# Patient Record
Sex: FEMALE | Race: WHITE | Employment: OTHER | URBAN - METROPOLITAN AREA
[De-identification: names, ages, dates, MRNs, and addresses within clinical notes are randomized per-mention and may not be internally consistent; named-entity substitution may affect disease eponyms.]

---

## 2018-06-15 ENCOUNTER — APPOINTMENT (OUTPATIENT)
Dept: CT IMAGING | Age: 58
End: 2018-06-15
Payer: COMMERCIAL

## 2018-06-15 ENCOUNTER — HOSPITAL ENCOUNTER (EMERGENCY)
Age: 58
Discharge: HOME | End: 2018-06-15
Attending: EMERGENCY MEDICINE
Payer: COMMERCIAL

## 2018-06-15 VITALS
BODY MASS INDEX: 25.03 KG/M2 | HEART RATE: 74 BPM | TEMPERATURE: 98.1 F | RESPIRATION RATE: 16 BRPM | SYSTOLIC BLOOD PRESSURE: 132 MMHG | WEIGHT: 136 LBS | HEIGHT: 62 IN | DIASTOLIC BLOOD PRESSURE: 78 MMHG | OXYGEN SATURATION: 99 %

## 2018-06-15 DIAGNOSIS — R10.11 RIGHT UPPER QUADRANT ABDOMINAL PAIN: Primary | ICD-10-CM

## 2018-06-15 LAB
ALBUMIN SERPL-MCNC: 3.8 G/DL (ref 3.5–5.2)
ALP BLD-CCNC: 92 U/L (ref 35–104)
ALT SERPL-CCNC: 46 U/L (ref 0–32)
ANION GAP SERPL CALCULATED.3IONS-SCNC: 16 MMOL/L (ref 7–16)
AST SERPL-CCNC: 29 U/L (ref 0–31)
BASOPHILS ABSOLUTE: 0.02 E9/L (ref 0–0.2)
BASOPHILS RELATIVE PERCENT: 0.3 % (ref 0–2)
BILIRUB SERPL-MCNC: 0.3 MG/DL (ref 0–1.2)
BILIRUBIN URINE: NEGATIVE
BLOOD, URINE: NEGATIVE
BUN BLDV-MCNC: 28 MG/DL (ref 6–20)
CALCIUM SERPL-MCNC: 9.5 MG/DL (ref 8.6–10.2)
CHLORIDE BLD-SCNC: 102 MMOL/L (ref 98–107)
CLARITY: CLEAR
CO2: 22 MMOL/L (ref 22–29)
COLOR: YELLOW
CREAT SERPL-MCNC: 0.7 MG/DL (ref 0.5–1)
EKG ATRIAL RATE: 79 BPM
EKG P AXIS: 60 DEGREES
EKG P-R INTERVAL: 144 MS
EKG Q-T INTERVAL: 392 MS
EKG QRS DURATION: 72 MS
EKG QTC CALCULATION (BAZETT): 449 MS
EKG R AXIS: 66 DEGREES
EKG T AXIS: 75 DEGREES
EKG VENTRICULAR RATE: 79 BPM
EOSINOPHILS ABSOLUTE: 0.09 E9/L (ref 0.05–0.5)
EOSINOPHILS RELATIVE PERCENT: 1.3 % (ref 0–6)
GFR AFRICAN AMERICAN: >60
GFR NON-AFRICAN AMERICAN: >60 ML/MIN/1.73
GLUCOSE BLD-MCNC: 227 MG/DL (ref 74–109)
GLUCOSE URINE: NEGATIVE MG/DL
HCT VFR BLD CALC: 38.2 % (ref 34–48)
HEMOGLOBIN: 13 G/DL (ref 11.5–15.5)
IMMATURE GRANULOCYTES #: 0.01 E9/L
IMMATURE GRANULOCYTES %: 0.1 % (ref 0–5)
KETONES, URINE: NEGATIVE MG/DL
LACTIC ACID: 1.4 MMOL/L (ref 0.5–2.2)
LEUKOCYTE ESTERASE, URINE: NEGATIVE
LIPASE: 73 U/L (ref 13–60)
LYMPHOCYTES ABSOLUTE: 2.88 E9/L (ref 1.5–4)
LYMPHOCYTES RELATIVE PERCENT: 41.7 % (ref 20–42)
MCH RBC QN AUTO: 29.6 PG (ref 26–35)
MCHC RBC AUTO-ENTMCNC: 34 % (ref 32–34.5)
MCV RBC AUTO: 87 FL (ref 80–99.9)
MONOCYTES ABSOLUTE: 0.65 E9/L (ref 0.1–0.95)
MONOCYTES RELATIVE PERCENT: 9.4 % (ref 2–12)
NEUTROPHILS ABSOLUTE: 3.25 E9/L (ref 1.8–7.3)
NEUTROPHILS RELATIVE PERCENT: 47.2 % (ref 43–80)
NITRITE, URINE: NEGATIVE
PDW BLD-RTO: 12.1 FL (ref 11.5–15)
PH UA: 7 (ref 5–9)
PLATELET # BLD: 259 E9/L (ref 130–450)
PMV BLD AUTO: 11.1 FL (ref 7–12)
POTASSIUM SERPL-SCNC: 4.3 MMOL/L (ref 3.5–5)
PROTEIN UA: NEGATIVE MG/DL
RBC # BLD: 4.39 E12/L (ref 3.5–5.5)
SODIUM BLD-SCNC: 140 MMOL/L (ref 132–146)
SPECIFIC GRAVITY UA: 1.01 (ref 1–1.03)
TOTAL PROTEIN: 6.9 G/DL (ref 6.4–8.3)
TROPONIN: <0.01 NG/ML (ref 0–0.03)
UROBILINOGEN, URINE: 0.2 E.U./DL
WBC # BLD: 6.9 E9/L (ref 4.5–11.5)

## 2018-06-15 PROCEDURE — 85025 COMPLETE CBC W/AUTO DIFF WBC: CPT

## 2018-06-15 PROCEDURE — 6360000002 HC RX W HCPCS: Performed by: EMERGENCY MEDICINE

## 2018-06-15 PROCEDURE — 2580000003 HC RX 258: Performed by: EMERGENCY MEDICINE

## 2018-06-15 PROCEDURE — 6360000004 HC RX CONTRAST MEDICATION: Performed by: RADIOLOGY

## 2018-06-15 PROCEDURE — 83605 ASSAY OF LACTIC ACID: CPT

## 2018-06-15 PROCEDURE — 80053 COMPREHEN METABOLIC PANEL: CPT

## 2018-06-15 PROCEDURE — 93005 ELECTROCARDIOGRAM TRACING: CPT | Performed by: EMERGENCY MEDICINE

## 2018-06-15 PROCEDURE — 74177 CT ABD & PELVIS W/CONTRAST: CPT

## 2018-06-15 PROCEDURE — 99284 EMERGENCY DEPT VISIT MOD MDM: CPT

## 2018-06-15 PROCEDURE — 96374 THER/PROPH/DIAG INJ IV PUSH: CPT

## 2018-06-15 PROCEDURE — 36415 COLL VENOUS BLD VENIPUNCTURE: CPT

## 2018-06-15 PROCEDURE — 83690 ASSAY OF LIPASE: CPT

## 2018-06-15 PROCEDURE — 81003 URINALYSIS AUTO W/O SCOPE: CPT

## 2018-06-15 PROCEDURE — 84484 ASSAY OF TROPONIN QUANT: CPT

## 2018-06-15 RX ORDER — SODIUM CHLORIDE 9 MG/ML
1000 INJECTION, SOLUTION INTRAVENOUS ONCE
Status: COMPLETED | OUTPATIENT
Start: 2018-06-15 | End: 2018-06-15

## 2018-06-15 RX ORDER — KETOROLAC TROMETHAMINE 30 MG/ML
30 INJECTION, SOLUTION INTRAMUSCULAR; INTRAVENOUS ONCE
Status: COMPLETED | OUTPATIENT
Start: 2018-06-15 | End: 2018-06-15

## 2018-06-15 RX ADMIN — IOPAMIDOL 80 ML: 755 INJECTION, SOLUTION INTRAVENOUS at 05:16

## 2018-06-15 RX ADMIN — SODIUM CHLORIDE 1000 ML: 9 INJECTION, SOLUTION INTRAVENOUS at 04:21

## 2018-06-15 RX ADMIN — KETOROLAC TROMETHAMINE 30 MG: 30 INJECTION, SOLUTION INTRAMUSCULAR at 04:21

## 2018-06-15 ASSESSMENT — PAIN DESCRIPTION - DESCRIPTORS
DESCRIPTORS: ACHING
DESCRIPTORS: ACHING

## 2018-06-15 ASSESSMENT — ENCOUNTER SYMPTOMS
NAUSEA: 0
COUGH: 0
SORE THROAT: 0
ABDOMINAL PAIN: 1
SINUS PRESSURE: 0
BACK PAIN: 0
EYE DISCHARGE: 0
EYE PAIN: 0
EYE REDNESS: 0
DIARRHEA: 0
SHORTNESS OF BREATH: 0
ABDOMINAL DISTENTION: 0
VOMITING: 0
WHEEZING: 0

## 2018-06-15 ASSESSMENT — PAIN DESCRIPTION - PROGRESSION: CLINICAL_PROGRESSION: RAPIDLY IMPROVING

## 2018-06-15 ASSESSMENT — PAIN SCALES - GENERAL
PAINLEVEL_OUTOF10: 8
PAINLEVEL_OUTOF10: 10
PAINLEVEL_OUTOF10: 5

## 2018-06-15 ASSESSMENT — PAIN DESCRIPTION - PAIN TYPE
TYPE: ACUTE PAIN
TYPE: ACUTE PAIN

## 2018-06-15 ASSESSMENT — PAIN DESCRIPTION - ORIENTATION
ORIENTATION: RIGHT
ORIENTATION: RIGHT;MID

## 2018-09-14 ENCOUNTER — APPOINTMENT (OUTPATIENT)
Dept: GENERAL RADIOLOGY | Age: 58
End: 2018-09-14
Payer: COMMERCIAL

## 2018-09-14 ENCOUNTER — APPOINTMENT (OUTPATIENT)
Dept: ULTRASOUND IMAGING | Age: 58
End: 2018-09-14
Payer: COMMERCIAL

## 2018-09-14 ENCOUNTER — HOSPITAL ENCOUNTER (EMERGENCY)
Age: 58
Discharge: HOME | End: 2018-09-15
Attending: EMERGENCY MEDICINE
Payer: COMMERCIAL

## 2018-09-14 VITALS
BODY MASS INDEX: 22.82 KG/M2 | HEIGHT: 62 IN | SYSTOLIC BLOOD PRESSURE: 164 MMHG | OXYGEN SATURATION: 98 % | HEART RATE: 73 BPM | DIASTOLIC BLOOD PRESSURE: 72 MMHG | TEMPERATURE: 98.9 F | RESPIRATION RATE: 18 BRPM | WEIGHT: 124 LBS

## 2018-09-14 DIAGNOSIS — R07.81 RIB PAIN ON RIGHT SIDE: Primary | ICD-10-CM

## 2018-09-14 LAB
ALBUMIN SERPL-MCNC: 4.1 G/DL (ref 3.5–5.2)
ALP BLD-CCNC: 111 U/L (ref 35–104)
ALT SERPL-CCNC: 18 U/L (ref 0–32)
ANION GAP SERPL CALCULATED.3IONS-SCNC: 13 MMOL/L (ref 7–16)
AST SERPL-CCNC: 28 U/L (ref 0–31)
BILIRUB SERPL-MCNC: 0.5 MG/DL (ref 0–1.2)
BUN BLDV-MCNC: 23 MG/DL (ref 6–20)
CALCIUM SERPL-MCNC: 10 MG/DL (ref 8.6–10.2)
CHLORIDE BLD-SCNC: 104 MMOL/L (ref 98–107)
CO2: 21 MMOL/L (ref 22–29)
CREAT SERPL-MCNC: 0.5 MG/DL (ref 0.5–1)
GFR AFRICAN AMERICAN: >60
GFR NON-AFRICAN AMERICAN: >60 ML/MIN/1.73
GLUCOSE BLD-MCNC: 138 MG/DL (ref 74–109)
HCT VFR BLD CALC: 41.9 % (ref 34–48)
HEMOGLOBIN: 14 G/DL (ref 11.5–15.5)
LIPASE: 58 U/L (ref 13–60)
MCH RBC QN AUTO: 29.7 PG (ref 26–35)
MCHC RBC AUTO-ENTMCNC: 33.4 % (ref 32–34.5)
MCV RBC AUTO: 89 FL (ref 80–99.9)
PDW BLD-RTO: 12.6 FL (ref 11.5–15)
PLATELET # BLD: 263 E9/L (ref 130–450)
PMV BLD AUTO: 11.1 FL (ref 7–12)
POTASSIUM SERPL-SCNC: 5 MMOL/L (ref 3.5–5)
RBC # BLD: 4.71 E12/L (ref 3.5–5.5)
SODIUM BLD-SCNC: 138 MMOL/L (ref 132–146)
TOTAL PROTEIN: 7.5 G/DL (ref 6.4–8.3)
WBC # BLD: 8.6 E9/L (ref 4.5–11.5)

## 2018-09-14 PROCEDURE — 85027 COMPLETE CBC AUTOMATED: CPT

## 2018-09-14 PROCEDURE — 83690 ASSAY OF LIPASE: CPT

## 2018-09-14 PROCEDURE — 76705 ECHO EXAM OF ABDOMEN: CPT

## 2018-09-14 PROCEDURE — 99285 EMERGENCY DEPT VISIT HI MDM: CPT

## 2018-09-14 PROCEDURE — 80053 COMPREHEN METABOLIC PANEL: CPT

## 2018-09-14 PROCEDURE — 6360000002 HC RX W HCPCS: Performed by: STUDENT IN AN ORGANIZED HEALTH CARE EDUCATION/TRAINING PROGRAM

## 2018-09-14 PROCEDURE — 96374 THER/PROPH/DIAG INJ IV PUSH: CPT

## 2018-09-14 PROCEDURE — 36415 COLL VENOUS BLD VENIPUNCTURE: CPT

## 2018-09-14 PROCEDURE — 71046 X-RAY EXAM CHEST 2 VIEWS: CPT

## 2018-09-14 RX ORDER — IBUPROFEN 800 MG/1
800 TABLET ORAL EVERY 6 HOURS PRN
Qty: 20 TABLET | Refills: 3 | Status: SHIPPED | OUTPATIENT
Start: 2018-09-14 | End: 2018-09-14

## 2018-09-14 RX ORDER — KETOROLAC TROMETHAMINE 15 MG/ML
15 INJECTION, SOLUTION INTRAMUSCULAR; INTRAVENOUS ONCE
Status: COMPLETED | OUTPATIENT
Start: 2018-09-14 | End: 2018-09-14

## 2018-09-14 RX ORDER — CYCLOBENZAPRINE HCL 10 MG
10 TABLET ORAL 3 TIMES DAILY PRN
Status: DISCONTINUED | OUTPATIENT
Start: 2018-09-14 | End: 2018-09-14

## 2018-09-14 RX ORDER — IBUPROFEN 800 MG/1
800 TABLET ORAL EVERY 6 HOURS PRN
Qty: 20 TABLET | Refills: 0 | Status: SHIPPED | OUTPATIENT
Start: 2018-09-14 | End: 2022-08-29 | Stop reason: ALTCHOICE

## 2018-09-14 RX ORDER — CYCLOBENZAPRINE HCL 10 MG
10 TABLET ORAL ONCE
Status: COMPLETED | OUTPATIENT
Start: 2018-09-15 | End: 2018-09-15

## 2018-09-14 RX ADMIN — KETOROLAC TROMETHAMINE 15 MG: 15 INJECTION, SOLUTION INTRAMUSCULAR; INTRAVENOUS at 22:46

## 2018-09-14 ASSESSMENT — PAIN SCALES - GENERAL
PAINLEVEL_OUTOF10: 10
PAINLEVEL_OUTOF10: 10

## 2018-09-14 ASSESSMENT — ENCOUNTER SYMPTOMS
SHORTNESS OF BREATH: 0
COUGH: 0
RHINORRHEA: 0
VOMITING: 0
SORE THROAT: 0
DIARRHEA: 0
BACK PAIN: 0
NAUSEA: 0
ABDOMINAL PAIN: 0

## 2018-09-14 ASSESSMENT — PAIN DESCRIPTION - PROGRESSION: CLINICAL_PROGRESSION: GRADUALLY WORSENING

## 2018-09-14 ASSESSMENT — PAIN DESCRIPTION - DESCRIPTORS: DESCRIPTORS: SHARP

## 2018-09-14 ASSESSMENT — PAIN DESCRIPTION - LOCATION: LOCATION: RIB CAGE

## 2018-09-14 ASSESSMENT — PAIN DESCRIPTION - ORIENTATION: ORIENTATION: RIGHT

## 2018-09-14 ASSESSMENT — PAIN DESCRIPTION - FREQUENCY: FREQUENCY: CONTINUOUS

## 2018-09-14 ASSESSMENT — PAIN DESCRIPTION - PAIN TYPE: TYPE: ACUTE PAIN

## 2018-09-15 PROCEDURE — 6370000000 HC RX 637 (ALT 250 FOR IP): Performed by: STUDENT IN AN ORGANIZED HEALTH CARE EDUCATION/TRAINING PROGRAM

## 2018-09-15 RX ADMIN — CYCLOBENZAPRINE 10 MG: 10 TABLET, FILM COATED ORAL at 00:09

## 2018-09-15 NOTE — ED PROVIDER NOTES
Bowel sounds are normal. There is no tenderness. There is no rebound and no guarding. Musculoskeletal: Normal range of motion. She exhibits tenderness. She exhibits no deformity. She has extreme tenderness to palpation of right rib cage. Neurological: She is alert and oriented to person, place, and time. No cranial nerve deficit. Coordination normal.   Skin: Skin is warm. No rash noted. Nursing note and vitals reviewed. Procedures    MDM    Patient presents to the ED for right sided rib pain. Differential diagnoses included but not limited to rib fracture, pancreatitis, . Workup in the ED revealed negative CXR, negative RUQ us, negative lipase. Patient was given Toradol and Flexeril for their symptoms with good improvement. Pain decreased however worsened with ultrasound exam. Patient continues to be non-toxic on re-evaluation. Findings were discussed with the patient and reasons to immediately return to the ED were articulated to them. They will follow-up with their PCP, which she doesn't have so a referral to [facility] was given. ED Course as of Sep 15 0145   Fri Sep 14, 2018   2303 ATTENDING PROVIDER ATTESTATION:     I have personally performed and/or participated in the history, exam, medical decision making, and procedures and agree with all pertinent clinical information unless otherwise noted. I have also reviewed and agree with the past medical, family and social history unless otherwise noted. I have discussed this patient in detail with the resident, and provided the instruction and education regarding patient complaining of right lower lateral rib pain. She was bending over out of a chair to pick something up couple days ago she had a sudden onset of sharp pain to the right lateral ribs in the same area that she had a recent broken rib. Denies shortness of breath although having pain with deep breathing and pain with movement of the chest wall.  If she holds still she is able to control the pain. No fevers or productive cough. No abdominal pain. No nausea or vomiting. No leg pain or swelling. No new fall or other injury. .  My findings/plan: Patient sitting the bed appears uncomfortable but in no distress. Has some splinting to breathing and pain on palpation of the right lower lateral rib, chest wall. Abdomen is soft with no particular tenderness although as you get near the ribs she starts to guard a little bit but states that it is mostly the ribs are hurting on the abdomen. She has no midline cervical, thoracic or lumbar spine tenderness and her breath sounds are equal. No jaundice or icterus. No pretibial edema or calf pain. [NC]   5364 To reevaluate the patient. She states that she had an extremely painful time during ultrasound. Currently she says without moving she is a 5 out of 10 pain. [BB]      ED Course User Index  [BB] Luis Miguel Chetan,   [NC] Rhea Ku DO       --------------------------------------------- PAST HISTORY ---------------------------------------------  Past Medical History:  has a past medical history of Bulging of intervertebral disc between L4 and L5; Diabetes mellitus type 2, uncomplicated ([facility] [address] Essential hypertension; Mixed hyperlipidemia; and Seizures ([facility] [address] Past Surgical History:  has a past surgical history that includes Tonsillectomy; Appendectomy; and Hysterectomy. Social History:  reports that she has been smoking Cigarettes. She has been smoking about 0.50 packs per day. She has never used smokeless tobacco. She reports that she does not drink alcohol or use drugs. Family History: family history is not on file. The patients home medications have been reviewed.     Allergies: Morphine and related    -------------------------------------------------- RESULTS -------------------------------------------------  Labs:  Results for orders placed or performed during the hospital encounter of 09/14/18   CBC   Result Value

## 2018-12-25 ENCOUNTER — HOSPITAL ENCOUNTER (EMERGENCY)
Age: 58
Discharge: LEFT BEFORE DISCHARGE | End: 2018-12-25
Payer: COMMERCIAL

## 2019-05-07 ENCOUNTER — HOSPITAL ENCOUNTER (EMERGENCY)
Age: 59
Discharge: HOME | End: 2019-05-07
Attending: EMERGENCY MEDICINE
Payer: COMMERCIAL

## 2019-05-07 VITALS
HEART RATE: 67 BPM | OXYGEN SATURATION: 99 % | WEIGHT: 125 LBS | HEIGHT: 62 IN | RESPIRATION RATE: 24 BRPM | DIASTOLIC BLOOD PRESSURE: 88 MMHG | SYSTOLIC BLOOD PRESSURE: 172 MMHG | BODY MASS INDEX: 23 KG/M2 | TEMPERATURE: 98.7 F

## 2019-05-07 DIAGNOSIS — M54.50 ACUTE EXACERBATION OF CHRONIC LOW BACK PAIN: Primary | ICD-10-CM

## 2019-05-07 DIAGNOSIS — G40.909 SEIZURE DISORDER (HCC): ICD-10-CM

## 2019-05-07 DIAGNOSIS — G89.29 ACUTE EXACERBATION OF CHRONIC LOW BACK PAIN: Primary | ICD-10-CM

## 2019-05-07 LAB
ANION GAP SERPL CALCULATED.3IONS-SCNC: 11 MMOL/L (ref 7–16)
BASOPHILS ABSOLUTE: 0.02 E9/L (ref 0–0.2)
BASOPHILS RELATIVE PERCENT: 0.3 % (ref 0–2)
BILIRUBIN URINE: NEGATIVE
BLOOD, URINE: NEGATIVE
BUN BLDV-MCNC: 18 MG/DL (ref 8–23)
CALCIUM SERPL-MCNC: 9.8 MG/DL (ref 8.6–10.2)
CHLORIDE BLD-SCNC: 109 MMOL/L (ref 98–107)
CLARITY: CLEAR
CO2: 23 MMOL/L (ref 22–29)
COLOR: YELLOW
CREAT SERPL-MCNC: 0.7 MG/DL (ref 0.5–1)
EOSINOPHILS ABSOLUTE: 0.09 E9/L (ref 0.05–0.5)
EOSINOPHILS RELATIVE PERCENT: 1.1 % (ref 0–6)
GFR AFRICAN AMERICAN: >60
GFR NON-AFRICAN AMERICAN: >60 ML/MIN/1.73
GLUCOSE BLD-MCNC: 125 MG/DL (ref 74–99)
GLUCOSE URINE: NEGATIVE MG/DL
HCT VFR BLD CALC: 40.7 % (ref 34–48)
HEMOGLOBIN: 13.7 G/DL (ref 11.5–15.5)
IMMATURE GRANULOCYTES #: 0.02 E9/L
IMMATURE GRANULOCYTES %: 0.3 % (ref 0–5)
KETONES, URINE: ABNORMAL MG/DL
LACTIC ACID, SEPSIS: 1.9 MMOL/L (ref 0.5–1.9)
LEUKOCYTE ESTERASE, URINE: NEGATIVE
LYMPHOCYTES ABSOLUTE: 2.96 E9/L (ref 1.5–4)
LYMPHOCYTES RELATIVE PERCENT: 37.5 % (ref 20–42)
MCH RBC QN AUTO: 29.9 PG (ref 26–35)
MCHC RBC AUTO-ENTMCNC: 33.7 % (ref 32–34.5)
MCV RBC AUTO: 88.9 FL (ref 80–99.9)
MONOCYTES ABSOLUTE: 0.52 E9/L (ref 0.1–0.95)
MONOCYTES RELATIVE PERCENT: 6.6 % (ref 2–12)
NEUTROPHILS ABSOLUTE: 4.29 E9/L (ref 1.8–7.3)
NEUTROPHILS RELATIVE PERCENT: 54.2 % (ref 43–80)
NITRITE, URINE: NEGATIVE
PDW BLD-RTO: 11.9 FL (ref 11.5–15)
PH UA: 6 (ref 5–9)
PLATELET # BLD: 298 E9/L (ref 130–450)
PMV BLD AUTO: 9.9 FL (ref 7–12)
POTASSIUM SERPL-SCNC: 4.9 MMOL/L (ref 3.5–5)
PROTEIN UA: NEGATIVE MG/DL
RBC # BLD: 4.58 E12/L (ref 3.5–5.5)
SODIUM BLD-SCNC: 143 MMOL/L (ref 132–146)
SPECIFIC GRAVITY UA: 1.02 (ref 1–1.03)
UROBILINOGEN, URINE: 0.2 E.U./DL
WBC # BLD: 7.9 E9/L (ref 4.5–11.5)

## 2019-05-07 PROCEDURE — 80048 BASIC METABOLIC PNL TOTAL CA: CPT

## 2019-05-07 PROCEDURE — 36415 COLL VENOUS BLD VENIPUNCTURE: CPT

## 2019-05-07 PROCEDURE — 85025 COMPLETE CBC W/AUTO DIFF WBC: CPT

## 2019-05-07 PROCEDURE — 93010 ELECTROCARDIOGRAM REPORT: CPT | Performed by: INTERNAL MEDICINE

## 2019-05-07 PROCEDURE — 81003 URINALYSIS AUTO W/O SCOPE: CPT

## 2019-05-07 PROCEDURE — 99284 EMERGENCY DEPT VISIT MOD MDM: CPT

## 2019-05-07 PROCEDURE — 6370000000 HC RX 637 (ALT 250 FOR IP): Performed by: STUDENT IN AN ORGANIZED HEALTH CARE EDUCATION/TRAINING PROGRAM

## 2019-05-07 PROCEDURE — 83605 ASSAY OF LACTIC ACID: CPT

## 2019-05-07 PROCEDURE — 93005 ELECTROCARDIOGRAM TRACING: CPT | Performed by: STUDENT IN AN ORGANIZED HEALTH CARE EDUCATION/TRAINING PROGRAM

## 2019-05-07 RX ORDER — HYDROCODONE BITARTRATE AND ACETAMINOPHEN 5; 325 MG/1; MG/1
1 TABLET ORAL EVERY 4 HOURS PRN
Qty: 18 TABLET | Refills: 0 | Status: SHIPPED | OUTPATIENT
Start: 2019-05-07 | End: 2019-05-10

## 2019-05-07 RX ORDER — INSULIN GLARGINE 100 [IU]/ML
27 INJECTION, SOLUTION SUBCUTANEOUS NIGHTLY
COMMUNITY
End: 2022-08-29 | Stop reason: ALTCHOICE

## 2019-05-07 RX ORDER — HYDROCODONE BITARTRATE AND ACETAMINOPHEN 5; 325 MG/1; MG/1
1 TABLET ORAL ONCE
Status: COMPLETED | OUTPATIENT
Start: 2019-05-07 | End: 2019-05-07

## 2019-05-07 RX ADMIN — HYDROCODONE BITARTRATE AND ACETAMINOPHEN 1 TABLET: 5; 325 TABLET ORAL at 22:32

## 2019-05-07 ASSESSMENT — PAIN SCALES - GENERAL
PAINLEVEL_OUTOF10: 10
PAINLEVEL_OUTOF10: 10

## 2019-05-07 ASSESSMENT — ENCOUNTER SYMPTOMS
DIARRHEA: 0
EYE REDNESS: 0
NAUSEA: 0
SHORTNESS OF BREATH: 0
SORE THROAT: 0
EYE PAIN: 0
VOMITING: 0
COUGH: 0
ABDOMINAL PAIN: 0
CONSTIPATION: 0
BACK PAIN: 1
SINUS PAIN: 0

## 2019-05-08 LAB
EKG ATRIAL RATE: 71 BPM
EKG P AXIS: 72 DEGREES
EKG P-R INTERVAL: 128 MS
EKG Q-T INTERVAL: 410 MS
EKG QRS DURATION: 86 MS
EKG QTC CALCULATION (BAZETT): 445 MS
EKG R AXIS: 76 DEGREES
EKG T AXIS: 69 DEGREES
EKG VENTRICULAR RATE: 71 BPM

## 2019-05-08 NOTE — ED PROVIDER NOTES
The patient is a 66-year-old female presenting with back pain, seizures. She has a history of grand mal seizures, type II diabetes. Patient states she has lower back pain that radiates to her left leg. She has a history of herniated IV discs. Her pain is located to the L3-L4 area and is worse on the left than the right spinous processes. Her pain radiates down her left leg along the medial aspect of her thigh. It is tender to palpation on the area. It is made worse when she flexes her leg or strains her back. Her pain has caused her to be bedridden all day today. Her pain began Saturday and acutely started from no injury or event. Patient believes she had a seizure earlier today because she woke up and had jaw pain and disorientation. She states when she has had seizures in the past this is how she knows she had one is the disorientation and jaw pain. She states she has not been on any antiseizure medications for over a year. Patient is alert and oriented ×3 with no lateralizing signs or focal neuro deficits. She took ibuprofen today for her back pain with no relief. She denies dysuria, hematuria, but admits to history of kidney stones. Through chart review the patient was admitted back in 2013 and seen by neurology and found to have pseudoseizures. The history is provided by the patient. Review of Systems   Constitutional: Negative for chills and fever. HENT: Negative for congestion, sinus pain and sore throat. Jaw pain   Eyes: Negative for pain and redness. Respiratory: Negative for cough and shortness of breath. Cardiovascular: Negative for chest pain and palpitations. Gastrointestinal: Negative for abdominal pain, constipation, diarrhea, nausea and vomiting. Endocrine: Negative for polyuria. Genitourinary: Negative for difficulty urinating, dysuria, frequency and hematuria. Musculoskeletal: Positive for back pain (L3-L4 back pain). Negative for neck pain. Skin: Negative. Neurological: Positive for seizures and weakness (Left lower extremity weakness secondary to pain). Negative for dizziness, light-headedness, numbness and headaches. Hematological: Negative. Psychiatric/Behavioral: Negative for agitation and confusion. Physical Exam   Constitutional: She is oriented to person, place, and time. She appears well-developed and well-nourished. HENT:   Head: Normocephalic and atraumatic. Eyes: Pupils are equal, round, and reactive to light. Conjunctivae and EOM are normal.   Neck: Normal range of motion. No JVD present. Cardiovascular: Normal rate, regular rhythm, normal heart sounds and intact distal pulses. Pulmonary/Chest: Effort normal and breath sounds normal. No respiratory distress. She has no wheezes. Abdominal: Soft. Bowel sounds are normal. She exhibits no distension and no mass. There is no tenderness. There is no guarding. Musculoskeletal: She exhibits tenderness (Tenderness to palpation to spinous processes of L3-L4 worse on the left than the right side of the low back. ). She exhibits no edema. Decreased range of motion of left lower extremity hip region due to pain in the low back. Pain made worse upon flexion of the right hip and palpation of the low back spinous processes. Neurological: She is alert and oriented to person, place, and time. She displays normal reflexes. No cranial nerve deficit. She exhibits normal muscle tone. Coordination normal.   Skin: Skin is warm and dry. Psychiatric: She has a normal mood and affect. Vitals reviewed.       Procedures    MDM       Patient presents to the ED for   Chief Complaint   Patient presents with    Back Pain     lower back pain radiating to left leg    Seizures     patient has seizure disorder and she thinks she had a seizure today because she woke up with jaw pain and disorientation   Upon searching through the chart, patient has had been evaluated in the past for seizures and found to have pseudoseizures. Initial workup of UTI for her acute back pain demonstrated no infections or blood making it less likely she has a stone causing her back pain. She is given Norco with good relief of her pain. She is also recommended to follow-up with an orthopedist for which she was given the number to schedule an appointment she was also given a three-day course of Norco for acute on chronic back pain. Patient continues to be non-toxic on re-evaluation. Findings were discussed with the patient and reasons to immediately return to the ED were articulated to them. They will follow-up with an orthopedist      --------------------------------------------- PAST HISTORY ---------------------------------------------  Past Medical History:  has a past medical history of Seizures ([facility] [address] and Type II or unspecified type diabetes mellitus without mention of complication, not stated as uncontrolled. Past Surgical History:  has a past surgical history that includes Appendectomy; Tonsillectomy; and Hysterectomy. Social History:  reports that she quit smoking about 5 years ago. She smoked 0.75 packs per day. She has never used smokeless tobacco. She reports that she does not drink alcohol or use drugs. Family History: family history includes Diabetes in her father; Heart Disease in her mother. The patients home medications have been reviewed.     Allergies: Morphine    -------------------------------------------------- RESULTS -------------------------------------------------  Labs:  Results for orders placed or performed during the hospital encounter of 05/07/19   CBC Auto Differential   Result Value Ref Range    WBC 7.9 4.5 - 11.5 E9/L    RBC 4.58 3.50 - 5.50 E12/L    Hemoglobin 13.7 11.5 - 15.5 g/dL    Hematocrit 40.7 34.0 - 48.0 %    MCV 88.9 80.0 - 99.9 fL    MCH 29.9 26.0 - 35.0 pg    MCHC 33.7 32.0 - 34.5 %    RDW 11.9 11.5 - 15.0 fL    Platelets 834 038 - 669 E9/L    MPV 9.9 7.0 - 12.0 fL    Neutrophils % 54.2 43.0 - 80.0 %    Immature Granulocytes % 0.3 0.0 - 5.0 %    Lymphocytes % 37.5 20.0 - 42.0 %    Monocytes % 6.6 2.0 - 12.0 %    Eosinophils % 1.1 0.0 - 6.0 %    Basophils % 0.3 0.0 - 2.0 %    Neutrophils # 4.29 1.80 - 7.30 E9/L    Immature Granulocytes # 0.02 E9/L    Lymphocytes # 2.96 1.50 - 4.00 E9/L    Monocytes # 0.52 0.10 - 0.95 E9/L    Eosinophils # 0.09 0.05 - 0.50 E9/L    Basophils # 0.02 0.00 - 0.20 H6/I   Basic Metabolic Panel   Result Value Ref Range    Sodium 143 132 - 146 mmol/L    Potassium 4.9 3.5 - 5.0 mmol/L    Chloride 109 (H) 98 - 107 mmol/L    CO2 23 22 - 29 mmol/L    Anion Gap 11 7 - 16 mmol/L    Glucose 125 (H) 74 - 99 mg/dL    BUN 18 8 - 23 mg/dL    CREATININE 0.7 0.5 - 1.0 mg/dL    GFR Non-African American >60 >=60 mL/min/1.73    GFR African American >60     Calcium 9.8 8.6 - 10.2 mg/dL   Urinalysis   Result Value Ref Range    Color, UA Yellow Straw/Yellow    Clarity, UA Clear Clear    Glucose, Ur Negative Negative mg/dL    Bilirubin Urine Negative Negative    Ketones, Urine TRACE (A) Negative mg/dL    Specific Gravity, UA 1.020 1.005 - 1.030    Blood, Urine Negative Negative    pH, UA 6.0 5.0 - 9.0    Protein, UA Negative Negative mg/dL    Urobilinogen, Urine 0.2 <2.0 E.U./dL    Nitrite, Urine Negative Negative    Leukocyte Esterase, Urine Negative Negative   Lactate, Sepsis   Result Value Ref Range    Lactic Acid, Sepsis 1.9 0.5 - 1.9 mmol/L   EKG 12 Lead   Result Value Ref Range    Ventricular Rate 71 BPM    Atrial Rate 71 BPM    P-R Interval 128 ms    QRS Duration 86 ms    Q-T Interval 410 ms    QTc Calculation (Bazett) 445 ms    P Axis 72 degrees    R Axis 76 degrees    T Axis 69 degrees       Radiology:  No orders to display       ------------------------- NURSING NOTES AND VITALS REVIEWED ---------------------------  Date / Time Roomed:  5/7/2019  9:44 PM  ED Bed Assignment:  14/14    The nursing notes within the ED encounter and vital signs as below have been reviewed.    BP (!) 172/88 Pulse 67   Temp 98.7 °F (37.1 °C) (Oral)   Resp 24   Ht 5' 2\" (1.575 m)   Wt 125 lb (56.7 kg)   SpO2 99%   BMI 22.86 kg/m²           --------------------------------- ADDITIONAL PROVIDER NOTES ---------------------------------  At this time the patient is without objective evidence of an acute process requiring hospitalization or inpatient management. They have remained hemodynamically stable throughout their entire ED visit and are stable for discharge with outpatient follow-up. The plan has been discussed in detail and they are aware of the specific conditions for emergent return, as well as the importance of follow-up. Discharge Medication List as of 5/7/2019 11:35 PM          Diagnosis:  1. Acute exacerbation of chronic low back pain    2. Seizure disorder [facility])        Disposition:  Patient's disposition: Discharge  Patient's condition is stable.          Bhavin Doll DO  Resident  05/07/19 0028

## 2019-05-23 ENCOUNTER — APPOINTMENT (OUTPATIENT)
Dept: CT IMAGING | Age: 59
End: 2019-05-23
Payer: COMMERCIAL

## 2019-05-23 ENCOUNTER — HOSPITAL ENCOUNTER (EMERGENCY)
Age: 59
Discharge: HOME | End: 2019-05-24
Attending: EMERGENCY MEDICINE
Payer: COMMERCIAL

## 2019-05-23 DIAGNOSIS — R56.9 SEIZURE (HCC): Primary | ICD-10-CM

## 2019-05-23 DIAGNOSIS — G40.909 SEIZURE DISORDER (HCC): ICD-10-CM

## 2019-05-23 LAB
ALBUMIN SERPL-MCNC: 3.6 G/DL (ref 3.5–5.2)
ALP BLD-CCNC: 88 U/L (ref 35–104)
ALT SERPL-CCNC: 22 U/L (ref 0–32)
ANION GAP SERPL CALCULATED.3IONS-SCNC: 16 MMOL/L (ref 7–16)
ANISOCYTOSIS: ABNORMAL
AST SERPL-CCNC: 35 U/L (ref 0–31)
BASOPHILS ABSOLUTE: 0 E9/L (ref 0–0.2)
BASOPHILS RELATIVE PERCENT: 0.3 % (ref 0–2)
BILIRUB SERPL-MCNC: 0.5 MG/DL (ref 0–1.2)
BILIRUBIN URINE: NEGATIVE
BLOOD, URINE: NEGATIVE
BUN BLDV-MCNC: 17 MG/DL (ref 8–23)
CALCIUM SERPL-MCNC: 9.3 MG/DL (ref 8.6–10.2)
CHLORIDE BLD-SCNC: 105 MMOL/L (ref 98–107)
CLARITY: CLEAR
CO2: 19 MMOL/L (ref 22–29)
COLOR: NORMAL
CREAT SERPL-MCNC: 0.7 MG/DL (ref 0.5–1)
EOSINOPHILS ABSOLUTE: 0 E9/L (ref 0.05–0.5)
EOSINOPHILS RELATIVE PERCENT: 1 % (ref 0–6)
GFR AFRICAN AMERICAN: >60
GFR NON-AFRICAN AMERICAN: >60 ML/MIN/1.73
GLUCOSE BLD-MCNC: 145 MG/DL (ref 74–99)
GLUCOSE BLD-MCNC: 154 MG/DL
GLUCOSE URINE: NEGATIVE MG/DL
HCT VFR BLD CALC: 39.5 % (ref 34–48)
HEMOGLOBIN: 13 G/DL (ref 11.5–15.5)
KETONES, URINE: NEGATIVE MG/DL
LEUKOCYTE ESTERASE, URINE: NEGATIVE
LYMPHOCYTES ABSOLUTE: 3.13 E9/L (ref 1.5–4)
LYMPHOCYTES RELATIVE PERCENT: 46.1 % (ref 20–42)
MCH RBC QN AUTO: 29.9 PG (ref 26–35)
MCHC RBC AUTO-ENTMCNC: 32.9 % (ref 32–34.5)
MCV RBC AUTO: 90.8 FL (ref 80–99.9)
METER GLUCOSE: 144 MG/DL (ref 74–99)
METER GLUCOSE: 154 MG/DL (ref 74–99)
MONOCYTES ABSOLUTE: 0.2 E9/L (ref 0.1–0.95)
MONOCYTES RELATIVE PERCENT: 2.6 % (ref 2–12)
NEUTROPHILS ABSOLUTE: 3.47 E9/L (ref 1.8–7.3)
NEUTROPHILS RELATIVE PERCENT: 51.3 % (ref 43–80)
NITRITE, URINE: NEGATIVE
OVALOCYTES: ABNORMAL
PDW BLD-RTO: 11.9 FL (ref 11.5–15)
PH UA: 6 (ref 5–9)
PLATELET # BLD: 234 E9/L (ref 130–450)
PMV BLD AUTO: 10.3 FL (ref 7–12)
POIKILOCYTES: ABNORMAL
POTASSIUM SERPL-SCNC: 4.3 MMOL/L (ref 3.5–5)
PROTEIN UA: NEGATIVE MG/DL
RBC # BLD: 4.35 E12/L (ref 3.5–5.5)
SODIUM BLD-SCNC: 140 MMOL/L (ref 132–146)
SPECIFIC GRAVITY UA: 1.01 (ref 1–1.03)
TOTAL PROTEIN: 7.1 G/DL (ref 6.4–8.3)
UROBILINOGEN, URINE: 0.2 E.U./DL
WBC # BLD: 6.8 E9/L (ref 4.5–11.5)

## 2019-05-23 PROCEDURE — 36415 COLL VENOUS BLD VENIPUNCTURE: CPT

## 2019-05-23 PROCEDURE — 80053 COMPREHEN METABOLIC PANEL: CPT

## 2019-05-23 PROCEDURE — 82962 GLUCOSE BLOOD TEST: CPT

## 2019-05-23 PROCEDURE — 2580000003 HC RX 258: Performed by: STUDENT IN AN ORGANIZED HEALTH CARE EDUCATION/TRAINING PROGRAM

## 2019-05-23 PROCEDURE — 93005 ELECTROCARDIOGRAM TRACING: CPT | Performed by: STUDENT IN AN ORGANIZED HEALTH CARE EDUCATION/TRAINING PROGRAM

## 2019-05-23 PROCEDURE — 81003 URINALYSIS AUTO W/O SCOPE: CPT

## 2019-05-23 PROCEDURE — 70450 CT HEAD/BRAIN W/O DYE: CPT

## 2019-05-23 PROCEDURE — 85025 COMPLETE CBC W/AUTO DIFF WBC: CPT

## 2019-05-23 PROCEDURE — 99284 EMERGENCY DEPT VISIT MOD MDM: CPT

## 2019-05-23 PROCEDURE — 6360000002 HC RX W HCPCS: Performed by: STUDENT IN AN ORGANIZED HEALTH CARE EDUCATION/TRAINING PROGRAM

## 2019-05-23 RX ORDER — LEVETIRACETAM 10 MG/ML
1000 INJECTION INTRAVASCULAR ONCE
Status: COMPLETED | OUTPATIENT
Start: 2019-05-23 | End: 2019-05-24

## 2019-05-23 RX ORDER — 0.9 % SODIUM CHLORIDE 0.9 %
1000 INTRAVENOUS SOLUTION INTRAVENOUS ONCE
Status: COMPLETED | OUTPATIENT
Start: 2019-05-23 | End: 2019-05-24

## 2019-05-23 RX ADMIN — LEVETIRACETAM 1000 MG: 10 INJECTION INTRAVENOUS at 23:04

## 2019-05-23 RX ADMIN — SODIUM CHLORIDE 1000 ML: 9 INJECTION, SOLUTION INTRAVENOUS at 23:04

## 2019-05-23 ASSESSMENT — ENCOUNTER SYMPTOMS
CHEST TIGHTNESS: 0
WHEEZING: 0
COUGH: 0
ABDOMINAL PAIN: 0
DIARRHEA: 0
CONSTIPATION: 0
SHORTNESS OF BREATH: 0
RHINORRHEA: 0
NAUSEA: 0
SORE THROAT: 0
BLOOD IN STOOL: 0
BACK PAIN: 0
VOMITING: 0

## 2019-05-24 VITALS
DIASTOLIC BLOOD PRESSURE: 77 MMHG | BODY MASS INDEX: 22.86 KG/M2 | TEMPERATURE: 97.6 F | OXYGEN SATURATION: 98 % | WEIGHT: 125 LBS | SYSTOLIC BLOOD PRESSURE: 130 MMHG | RESPIRATION RATE: 24 BRPM | HEART RATE: 81 BPM

## 2019-05-24 LAB
EKG ATRIAL RATE: 88 BPM
EKG P AXIS: 60 DEGREES
EKG P-R INTERVAL: 150 MS
EKG Q-T INTERVAL: 390 MS
EKG QRS DURATION: 84 MS
EKG QTC CALCULATION (BAZETT): 471 MS
EKG R AXIS: 67 DEGREES
EKG T AXIS: 49 DEGREES
EKG VENTRICULAR RATE: 88 BPM
METER GLUCOSE: 173 MG/DL (ref 74–99)

## 2019-05-24 PROCEDURE — 82962 GLUCOSE BLOOD TEST: CPT

## 2019-05-24 PROCEDURE — 93010 ELECTROCARDIOGRAM REPORT: CPT | Performed by: INTERNAL MEDICINE

## 2019-05-24 RX ORDER — LEVETIRACETAM 500 MG/1
750 TABLET ORAL 2 TIMES DAILY
Qty: 30 TABLET | Refills: 0 | Status: SHIPPED | OUTPATIENT
Start: 2019-05-24 | End: 2022-08-29 | Stop reason: ALTCHOICE

## 2019-05-24 NOTE — ED PROVIDER NOTES
Patient is 57-year-old female who presents emergency department after a collapse. Has a past medical history significant for diabetes, seizure disorder. According to EMS, patient was at home with her  when she collapsed and was unresponsive. En route to the hospital EMS states that she had seizure-like activity. She was given 4 mg Ativan en route. Presentation emergency Department patient is responding to commands and responding to questions. She is speaking very slowly. No focal deficits. Per patient's , they're watching television when she became unresponsive when he started talking to her. He tried to wake her up with no improvement. He checked her blood sugar which was 146. On directed EMS. States that she'll have episodes somewhat similar to this with more shaking involved, but only lasts for a few seconds and they have not been to overly concerned. Patient has been prescribed antiseizure medications in the past by [facility] neurology. She decided to stop taking those medications 2-3 years ago. Stated that she did not like how they make her feel. No reports of emesis, fevers, recent illnesses. Patient is not complaining of any pain. Patient is still appears in a postictal state with slowed speech, but no focal deficits. Review of Systems   Constitutional: Negative for appetite change, diaphoresis and fever. HENT: Negative for congestion, rhinorrhea and sore throat. Eyes: Negative for visual disturbance. Respiratory: Negative for cough, chest tightness, shortness of breath and wheezing. Cardiovascular: Negative for chest pain, palpitations and leg swelling. Gastrointestinal: Negative for abdominal pain, blood in stool, constipation, diarrhea, nausea and vomiting. Endocrine: Negative for polyuria. Genitourinary: Negative for decreased urine volume, dysuria and vaginal discharge. Musculoskeletal: Negative for back pain, neck pain and neck stiffness.    Skin: ---------------------------------------------  Past Medical History:  has a past medical history of Bulging of intervertebral disc between L4 and L5, Diabetes mellitus type 2, uncomplicated [facility] Essential hypertension, Mixed hyperlipidemia, Seizures ([facility] Seizures ([facility] and Type II or unspecified type diabetes mellitus without mention of complication, not stated as uncontrolled. Past Surgical History:  has a past surgical history that includes Hysterectomy; Appendectomy; Tonsillectomy; and Hysterectomy. Social History:  reports that she has been smoking. She has been smoking about 0.75 packs per day. She has never used smokeless tobacco. She reports that she does not drink alcohol or use drugs. Family History: family history includes Diabetes in her father; Heart Disease in her mother. The patients home medications have been reviewed.     Allergies: Morphine and related and Morphine    -------------------------------------------------- RESULTS -------------------------------------------------  Labs:  Results for orders placed or performed during the hospital encounter of 05/23/19   CBC Auto Differential   Result Value Ref Range    WBC 6.8 4.5 - 11.5 E9/L    RBC 4.35 3.50 - 5.50 E12/L    Hemoglobin 13.0 11.5 - 15.5 g/dL    Hematocrit 39.5 34.0 - 48.0 %    MCV 90.8 80.0 - 99.9 fL    MCH 29.9 26.0 - 35.0 pg    MCHC 32.9 32.0 - 34.5 %    RDW 11.9 11.5 - 15.0 fL    Platelets 173 075 - 021 E9/L    MPV 10.3 7.0 - 12.0 fL    Neutrophils % 51.3 43.0 - 80.0 %    Lymphocytes % 46.1 (H) 20.0 - 42.0 %    Monocytes % 2.6 2.0 - 12.0 %    Eosinophils % 1.0 0.0 - 6.0 %    Basophils % 0.3 0.0 - 2.0 %    Neutrophils # 3.47 1.80 - 7.30 E9/L    Lymphocytes # 3.13 1.50 - 4.00 E9/L    Monocytes # 0.20 0.10 - 0.95 E9/L    Eosinophils # 0.00 (L) 0.05 - 0.50 E9/L    Basophils # 0.00 0.00 - 0.20 E9/L    Anisocytosis 1+     Poikilocytes 1+     Ovalocytes 1+    Urinalysis   Result Value Ref Range    Color, UA Straw Straw/Yellow    Clarity, UA Clear Clear    Glucose, Ur Negative Negative mg/dL    Bilirubin Urine Negative Negative    Ketones, Urine Negative Negative mg/dL    Specific Gravity, UA 1.010 1.005 - 1.030    Blood, Urine Negative Negative    pH, UA 6.0 5.0 - 9.0    Protein, UA Negative Negative mg/dL    Urobilinogen, Urine 0.2 <2.0 E.U./dL    Nitrite, Urine Negative Negative    Leukocyte Esterase, Urine Negative Negative   Comprehensive Metabolic Panel   Result Value Ref Range    Sodium 140 132 - 146 mmol/L    Potassium 4.3 3.5 - 5.0 mmol/L    Chloride 105 98 - 107 mmol/L    CO2 19 (L) 22 - 29 mmol/L    Anion Gap 16 7 - 16 mmol/L    Glucose 145 (H) 74 - 99 mg/dL    BUN 17 8 - 23 mg/dL    CREATININE 0.7 0.5 - 1.0 mg/dL    GFR Non-African American >60 >=60 mL/min/1.73    GFR African American >60     Calcium 9.3 8.6 - 10.2 mg/dL    Total Protein 7.1 6.4 - 8.3 g/dL    Alb 3.6 3.5 - 5.2 g/dL    Total Bilirubin 0.5 0.0 - 1.2 mg/dL    Alkaline Phosphatase 88 35 - 104 U/L    ALT 22 0 - 32 U/L    AST 35 (H) 0 - 31 U/L   POCT Glucose   Result Value Ref Range    Glucose 154 mg/dL   POCT Glucose   Result Value Ref Range    Meter Glucose 144 (H) 74 - 99 mg/dL   POCT Glucose   Result Value Ref Range    Meter Glucose 154 (H) 74 - 99 mg/dL   POCT Glucose   Result Value Ref Range    Meter Glucose 173 (H) 74 - 99 mg/dL       Radiology:  CT Head WO Contrast   Final Result   No acute intracranial abnormality, without significant   change since previous study of 2/9/2016          ------------------------- NURSING NOTES AND VITALS REVIEWED ---------------------------  Date / Time Roomed:  5/23/2019 10:02 PM  ED Bed Assignment:  02/02    The nursing notes within the ED encounter and vital signs as below have been reviewed.    /77   Pulse 81   Temp 97.6 °F (36.4 °C) (Oral)   Resp 24   Wt 125 lb (56.7 kg)   SpO2 98%   BMI 22.86 kg/m²   Oxygen Saturation Interpretation: Normal      ------------------------------------------ PROGRESS NOTES ------------------------------------------  I have spoken with the patient and discussed todays results, in addition to providing specific details for the plan of care and counseling regarding the diagnosis and prognosis. Their questions are answered at this time and they are agreeable with the plan. I discussed at length with them reasons for immediate return here for re evaluation. They will followup with primary care by calling their office tomorrow. --------------------------------- ADDITIONAL PROVIDER NOTES ---------------------------------  At this time the patient is without objective evidence of an acute process requiring hospitalization or inpatient management. They have remained hemodynamically stable throughout their entire ED visit and are stable for discharge with outpatient follow-up. Discussed imaging and laboratory results with patient and patient's . Patient previously was on Keppra 750 twice a day per her neurologist at [facility]. She was given a bolus of Keppra in the emergency department. Patient had no seizure activity while in the emergency department. Patient's labs were within normal limits. Imaging was negative for any acute pathology. Discussed with patient if she would like transfer to [facility] clinic or if she would like to be discharged and follow up with her neurologist outpatient. Patient and patient's  stated that they would like to be discharged and will follow up with neurologist on Tuesday when patient is in [facility] [employer] for other appointments. Started patient back on her Keppra at previous dosing from 3 years ago. Patient was back to baseline, but still fatigued while in the emergency department. Patient agreed with plan and was discharged home. The plan has been discussed in detail and they are aware of the specific conditions for emergent return, as well as the importance of follow-up.       New Prescriptions    LEVETIRACETAM (KEPPRA) 500 MG TABLET    Take 2 tablets by mouth 2 times daily       Diagnosis:  1. Seizure ([facility] [address]    2. Seizure disorder ([facility] [address]        Disposition:  Patient's disposition: Discharge to home  Patient's condition is stable. Janny Zee DO  Resident  05/24/19 0714    ATTENDING PROVIDER ATTESTATION:     I have personally performed and/or participated in the history, exam, medical decision making, and procedures and agree with all pertinent clinical information. I have also reviewed and agree with the past medical, family and social history unless otherwise noted. I have discussed this patient in detail with the resident, and provided the instruction and education regarding altered mental status and seizures. My findings/Plan: Patient is sleepy. Follows commands. Heart RRR. Lungs CTA bilaterally. Abdomen soft, nontender. Bowel sounds normal. No neuro deficits. Supportive treatment.  Discharge for outpatient follow up with her neurologist.           [address] [place] [address],   05/24/19 0144

## 2021-04-27 VITALS
BODY MASS INDEX: 29.08 KG/M2 | HEART RATE: 78 BPM | WEIGHT: 158 LBS | HEIGHT: 62 IN | DIASTOLIC BLOOD PRESSURE: 78 MMHG | SYSTOLIC BLOOD PRESSURE: 142 MMHG | TEMPERATURE: 98.1 F | RESPIRATION RATE: 16 BRPM | OXYGEN SATURATION: 98 %

## 2021-04-27 RX ORDER — OMEPRAZOLE 40 MG/1
40 CAPSULE, DELAYED RELEASE ORAL DAILY
COMMUNITY

## 2021-04-27 RX ORDER — DOXEPIN HYDROCHLORIDE 10 MG/1
10 CAPSULE ORAL NIGHTLY
COMMUNITY
End: 2022-08-29 | Stop reason: ALTCHOICE

## 2021-04-27 RX ORDER — LOSARTAN POTASSIUM 50 MG/1
50 TABLET ORAL DAILY
COMMUNITY
End: 2022-08-29 | Stop reason: CLARIF

## 2021-04-27 RX ORDER — CLOTRIMAZOLE AND BETAMETHASONE DIPROPIONATE 10; .64 MG/G; MG/G
CREAM TOPICAL 2 TIMES DAILY
COMMUNITY
End: 2022-08-29 | Stop reason: ALTCHOICE

## 2021-04-27 RX ORDER — MELOXICAM 15 MG/1
15 TABLET ORAL DAILY
COMMUNITY
End: 2022-08-29

## 2021-04-27 RX ORDER — FENOFIBRATE 160 MG/1
160 TABLET ORAL DAILY
COMMUNITY
End: 2022-08-29 | Stop reason: ALTCHOICE

## 2022-08-29 ENCOUNTER — HOSPITAL ENCOUNTER (OUTPATIENT)
Dept: INFUSION THERAPY | Age: 62
Discharge: HOME | End: 2022-08-29

## 2022-08-29 ENCOUNTER — TELEPHONE (OUTPATIENT)
Dept: CASE MANAGEMENT | Age: 62
End: 2022-08-29

## 2022-08-29 ENCOUNTER — OFFICE VISIT (OUTPATIENT)
Dept: ONCOLOGY | Age: 62
End: 2022-08-29
Payer: COMMERCIAL

## 2022-08-29 VITALS
OXYGEN SATURATION: 99 % | TEMPERATURE: 98 F | BODY MASS INDEX: 24.44 KG/M2 | SYSTOLIC BLOOD PRESSURE: 105 MMHG | WEIGHT: 132.8 LBS | HEIGHT: 62 IN | DIASTOLIC BLOOD PRESSURE: 66 MMHG | HEART RATE: 66 BPM

## 2022-08-29 DIAGNOSIS — C34.91 ADENOCARCINOMA OF RIGHT LUNG (HCC): ICD-10-CM

## 2022-08-29 PROCEDURE — G8427 DOCREV CUR MEDS BY ELIG CLIN: HCPCS | Performed by: INTERNAL MEDICINE

## 2022-08-29 PROCEDURE — 99214 OFFICE O/P EST MOD 30 MIN: CPT | Performed by: INTERNAL MEDICINE

## 2022-08-29 PROCEDURE — 3017F COLORECTAL CA SCREEN DOC REV: CPT | Performed by: INTERNAL MEDICINE

## 2022-08-29 PROCEDURE — G8420 CALC BMI NORM PARAMETERS: HCPCS | Performed by: INTERNAL MEDICINE

## 2022-08-29 PROCEDURE — 99205 OFFICE O/P NEW HI 60 MIN: CPT | Performed by: INTERNAL MEDICINE

## 2022-08-29 PROCEDURE — 1036F TOBACCO NON-USER: CPT | Performed by: INTERNAL MEDICINE

## 2022-08-29 RX ORDER — ASPIRIN 81 MG/1
TABLET ORAL DAILY
COMMUNITY

## 2022-08-29 RX ORDER — ROSUVASTATIN CALCIUM 10 MG/1
10 TABLET, COATED ORAL NIGHTLY
COMMUNITY

## 2022-08-29 RX ORDER — LOSARTAN POTASSIUM 50 MG/1
50 TABLET ORAL DAILY
COMMUNITY

## 2022-08-29 RX ORDER — CLONAZEPAM 0.5 MG/1
0.5 TABLET ORAL 2 TIMES DAILY PRN
COMMUNITY

## 2022-08-29 NOTE — TELEPHONE ENCOUNTER
Met with patient and her  during the initial consult with Dr. Ashli Jefferson for adenocarcinoma of the lung. Introduced nurse navigator role, gave contact information and informed of other supportive services in clinic: , nutrition and financial navigator. Patient is supported by her  and denied issues with living arrangements, transportation, insurance and prescription coverage. Her  works outside the home, travels a lot, discussed FMLA options and encouraged to contact his human resource department to have them fax the paperwork to the clinic if he decides to apply. Patient has adult children that live outside the state. She is very concerned about caring for herself once she begins chemotherapy and asked for transportation options if she's alone and unable to drive. She was agreeable with talking with the clinic  for emotional support and was fine with being contacted by phone. Patient is diabetic, on insulin and her  reported patient's current appetite as \"poor\", she denied unplanned weight loss. Informed the clinic dietitian will be consulted for additional support, patient was agreeable with being contacted by phone before her next appointment. Patient's cycle 1 Cisplatin and Alimta treatment is scheduled for 09/12/2022, she'll come for chemo teaching prior to her appointment. A referral has been sent for audiology evaluation. Patient and her  denied further needs today, encouraged them to call should any arise, they verbalized understanding. Akira Gutierres RN, ADN, BSE, OCN Patient Nurse Navigator

## 2022-08-29 NOTE — PROGRESS NOTES
[address]  1960 59 y.o. Referring Physician:     PCP: No primary care provider on file. Vitals:    22 1444   BP: 105/66   Pulse: 66   Temp: 98 °F (36.7 °C)   SpO2: 99%        Wt Readings from Last 3 Encounters:   22 132 lb 12.8 oz (60.2 kg)   17 158 lb (71.7 kg)   19 125 lb (56.7 kg)        Body mass index is 24.29 kg/m². Chief Complaint:   Chief Complaint   Patient presents with    Consultation    New Patient         Cancer Staging  No matching staging information was found for the patient. Prior Radiation Therapy? NO    Concurrent Chemo/radiation? NO    Prior Chemotherapy? NO    Prior Hormonal Therapy? NO    Head and Neck Cancer? No, patient does NOT have HN cancer. LMP: hysterectomy 27 yo    Age at first Menses: 13 yo    : 2    Para: 2          Current Outpatient Medications:     aspirin 81 MG EC tablet, Take by mouth daily, Disp: , Rfl:     clonazePAM (KLONOPIN) 0.5 MG tablet, Take 0.5 mg by mouth 2 times daily as needed. , Disp: , Rfl:     losartan (COZAAR) 50 MG tablet, Take 50 mg by mouth daily, Disp: , Rfl:     rosuvastatin (CRESTOR) 10 MG tablet, Take 10 mg by mouth at bedtime, Disp: , Rfl:     ticagrelor (BRILINTA) 90 MG TABS tablet, Take 90 mg by mouth 2 times daily, Disp: , Rfl:     METHIMAZOLE PO, Take 2.5 mg by mouth daily, Disp: , Rfl:     Insulin Glargine (BASAGLAR KWIKPEN SC), Inject 15 Units into the skin daily as needed Indications: sliding scale 15 units and no more than30 units per night., Disp: , Rfl:     Insulin Aspart (NOVOLOG SC), Inject into the skin Sliding Scale, Disp: , Rfl:     omeprazole (PRILOSEC) 40 MG delayed release capsule, Take 40 mg by mouth daily, Disp: , Rfl:     blood glucose test strips (ASCENSIA AUTODISC VI;ONE TOUCH ULTRA TEST VI) strip, 1 each by In Vitro route 2 times daily As needed. , Disp: , Rfl:     topiramate (TOPAMAX) 100 MG tablet, Take 100 mg by mouth 2 times daily. , Disp: , Rfl:     varenicline (CHANTIX CONTINUING MONTH TERRENCE) 1 MG tablet, Take 1 tablet by mouth 2 times daily. , Disp: 60 tablet, Rfl: 5    Blood Glucose Monitoring Suppl (ONE TOUCH ULTRA 2), by Does not apply route.  Delica lancets disp box of 50 use qd, one touch ultra test strips disp box of 50 test qd 3 ref., Disp: , Rfl:        Past Medical History:   Diagnosis Date    Bulging of intervertebral disc between L4 and L5     Diabetes mellitus type 2, uncomplicated ([facility] [address] 2725    Essential hypertension 2/10/2016    Mixed hyperlipidemia 2/10/2016    Seizures (HCC)     Seizures ([facility] [address]     Grand mal    Type II or unspecified type diabetes mellitus without mention of complication, not stated as uncontrolled        Past Surgical History:   Procedure Laterality Date    APPENDECTOMY      HYSTERECTOMY      HYSTERECTOMY      ovaries out    HYSTERECTOMY, TOTAL ABDOMINAL      TONSILLECTOMY         Family History   Problem Relation Age of Onset    Heart Disease Mother     Diabetes Father     Colon Cancer Brother        Social History     Socioeconomic History    Marital status:      Spouse name: Not on file    Number of children: Not on file    Years of education: Not on file    Highest education level: Not on file   Occupational History    Not on file   Tobacco Use    Smoking status: Every Day     Packs/day: 0.75     Types: Cigarettes     Last attempt to quit: 2013     Years since quittin.8    Smokeless tobacco: Never    Tobacco comments:     chantix   Substance and Sexual Activity    Alcohol use: No    Drug use: No    Sexual activity: Yes     Partners: Male   Other Topics Concern    Not on file   Social History Narrative    ** Merged History Encounter **          Social Determinants of Health     Financial Resource Strain: Not on file   Food Insecurity: Not on file   Transportation Needs: Not on file   Physical Activity: Not on file   Stress: Not on file   Social Connections: Not on file   Intimate Partner Violence: Not on file   Housing Stability: Not on file           Occupation: teacher [employer]  Retired:  YES: Patient is retired from [employer]. REVIEW OF SYSTEMS:    Pacemaker/Defibulator/ICD:  No    Mediport: No           FALLS RISK SCREENING ASSESSMENT    Instructions:  Assess the patient and [facility] the appropriate indicators that are present for fall risk identification. Total the numbers circled and assign a fall risk score from Table 2.  Reassess patient at a minimum every 12 weeks or with status change. Assessment   Date  8/29/2022     1. Mental Ability: confusion/cognitively impaired No - 0       2. Elimination Issues: incontinence, frequency No - 0       3. Ambulatory: use of assistive devices (walker, cane, off-loading devices), attached to equipment (IV pole, oxygen) No - 0     4. Sensory Limitations: dizziness, vertigo, impaired vision Yes - 3       5. Age Less than 65 years - 0       6. Medication: diuretics, strong analgesics, hypnotics, sedatives, antihypertensive agents   Yes - 3   7. Falls:  recent history of falls within the last 3 months (not to include slipping or tripping)   No - 0   TOTAL 6    If score of 4 or greater was education given? Yes       TABLE 2   Risk Score Risk Level Plan of Care   0-3 Little or  No Risk 1. Provide assistance as indicated for ambulation activities  2. Reorient confused/cognitively impaired patient  3. Call-light/bell within patient's reach  4. Chair/bed in low position, stretcher/bed with siderails up except when performing patient care activities  5. Educate patient/family/caregiver on falls prevention  6.  Reassess in 12 weeks or with any noted change in patient condition which places them at a risk for a fall   4-6 Moderate Risk 1. Provide assistance as indicated for ambulation activities  2. Reorient confused/cognitively impaired patient  3. Call-light/bell within patient's reach  4.   Chair/bed in low position, stretcher/bed with siderails up except when performing patient care activities  5. Educate patient/family/caregiver on falls prevention  6. Falls risk precaution (Yellow sticker Level II) placed on patient chart   7 or   Higher High Risk 1. Place patient in easily observable treatment room  2. Patient attended at all times by family member or staff  3. Provide assistance as indicated for ambulation activities  4. Reorient confused/cognitively impaired patient  5. Call-light/bell within patient's reach  6. Chair/bed in low position, stretcher/bed with siderails up except when performing patient care activities  7. Educate patient/family/caregiver on falls prevention  8. Falls risk precaution (Yellow sticker Level III) placed on patient chart           MALNUTRITION RISK SCREENING ASSESSMENT    Instructions:  Assess the patient and enter the appropriate indicators that are present for nutrition risk identification. Total the numbers entered and assign a risk score. Follow the appropriate action for total score listed below. Assessment   Date  8/29/2022     Have you lost weight without trying? 0- No     Have you been eating poorly because of a decreased appetite? 1- Yes   3. Do you have a diagnosis of head and neck cancer? 0- No                                                                                    TOTAL 1        Score of 0-1: No action  Score 2 or greater:   For Non-Diabetic Patient: Recommend adding Ensure Enlive 2 x daily and provide patient with Ensure wellness bag with coupons  For Diabetic Patient: Recommend adding Glucerna Shake 2 x daily and provide patient with Glucerna Wellness bag with coupons  Route to the dietitian via [address]    Are you having  difficulty performing daily routine tasks  due to fatigue or weakness (ie: bathing/showering, dressing, housework, meal prep, work, child Arlyn Na): No     Do you have any arm flexibility/ROM restrictions, swelling or pain

## 2022-08-29 NOTE — PROGRESS NOTES
[id number] [facility]  [place] [address]  Dept: [facility]: [phone]  Consult note    Reason for Visit: NSCLC. Referring Physician: Dr. Brandy Jenkins     PCP:  Dr. Boo Senior    History of Present Illness:     Daisy Boyer is an 59 y.o.  female with a past medical history significant for insulin-dependent diabetes mellitus, and Graves' disease, who was referred by Dr. Brandy Jenkins [facility] for non-small cell lung cancer: The patient was seen by Dr. Jillian Bennett in August 2020 following a work-up for right inguinal adenopathy that returned benign on excisional biopsy. During that work-up we also obtained a CAT scan of the chest which showed sub-centimeter nodular opacities measuring less than 5 mm for which was referred to the lung cancer screening program.    She underwent CAT scan of the abdomen and pelvis in May 2022 for abdominal pain and while this did not show any acute process in the abdomen or pelvis there was report of indeterminate 1.7 cm subpleural lung nodule in the medial right middle lobe, mildly increased in size since prior CT dated 6/26/2020. She subsequently underwent PET/CT which showed FDG avid right middle lobe pulmonary nodule, concerning for possible hypermetabolic neoplasm. No hypermetabolic mass, adenopathy, or   fluid collection. MRI brain was negative for metastatic disease. On 7/20/2022 she underwent right middle lobe wedge resection and completion lobectomy with final pathology [id number], AJCC eighth stage IIb    Since her lung surgery she suffered 2 MIs in early and mid August 2022 and underwent a total of 4 coronary artery stent placements.  She is currently on aspirin and Brilinta and established cardiology care with Dr. Krys Aguirre at [facility].    Recall she also has a history of diabetes treated with subcu insulin and Graves' disease for which she follows Dr. Angélica Calvo, endocrinologist at [facility]. She is making good recovery following her recent cardiac events and here with results of her final pathology. 7/20/2022  FINAL DIAGNOSIS   A, B. Lung, right middle lobe, wedge resection and completion lobectomy:  - Adenocarcinoma (2.1 cm), solid predominant (80%), with additional micropapillary (10%) and acinar (10%) patterns (See comment and synoptic template). - Visceral pleural invasion present. - Margins negative. C. Lymph node, 8R, excision:   - Negative for tumor (0/1). D. Lymph node, 9R, excision:   - Negative for tumor (0/1). E. Lymph node, level 7, excision:  - Negative for tumor (0/1). F. Lymph node, level 10R, excision:   - Metastatic adenocarcinoma (1/1). - Extranodal extension present. G. Lymph node, 11R, excision:   - Negative for tumor (0/1). H. Lymph node, 2R, excision:  - Negative for tumor (0/1). I. Lymph node, 4R, excision:   - Negative for tumor (0/1).      LUNG  8th Edition - Protocol posted: 7/27/2021  LUNG, RESECTION - All Specimens  SPECIMEN   Procedure Wedge resection   Completion lobectomy   Specimen Laterality Right   TUMOR   Tumor Focality Single focus   Tumor Site Middle lobe of lung   Tumor Size   Total Tumor Size (size of entire tumor) Greatest Dimension (Centimeters): 2.1 cm   Additional Dimension (Centimeters) 1.8 cm   1.3 cm   Histologic Type Invasive solid adenocarcinoma   Histologic Patterns Present Acinar: 10   Solid: 80   Micropapillary: 10   Histologic Grade G3, poorly differentiated   Visceral Pleura Invasion Present   Direct Invasion of Adjacent Structures Not applicable (no adjacent structures present)   Treatment Effect No known presurgical therapy   Lymphovascular Invasion Present   MARGINS   Margin Status for Invasive Carcinoma All margins negative for invasive carcinoma   Closest Margin(s) to Invasive Carcinoma Cannot be determined: prior wedge resection   Distance from Invasive Carcinoma to Closest Margin Cannot be determined: prior wedge resection   Margin Status for Non-Invasive Tumor Not applicable   REGIONAL LYMPH NODES   Lymph Node(s) from Prior Procedures No known prior lymph node sampling performed   Regional Lymph Node Status Tumor present in regional lymph node(s)   Number of Lymph Nodes with Tumor 1   [place] Site(s) with Tumor 10R: Hilar   Extranodal Extension Present   Number of Lymph Nodes Examined 7   [place] Site(s) Examined 2R: Upper paratracheal   4R: Lower paratracheal   8R: Para-esophageal (below nadiya)   9R: Pulmonary ligament   10R: Hilar   11R: Interlobar   7: Subcarinal     BRAF - No variant detected [Reference Sequence: ([id number])]. EGFR - No variant detected [Reference Sequence: ([id number])]. HER2 (ERBB2) - No variant detected [Reference Sequence:   ([id number])]. KRAS - No variant detected [Reference Sequence: ([id number])]. MET - No variant detected [Reference Sequence: (NM_[id number])]. RET Rearrangement 2% (0-14%)  ROS1 Rearrangement 4% (0-14%)   PD-L1 by IHC 5%  ALK by IHC negative     Post surgery she developed right-sided pleural effusion and was in the hospital for fluid drainage via pigtail catheter. After discharge from the hospital she developed chest pain and was diagnosed with myocardial infarction. He had 3 stent placed and was discharged home. 1 day after she developed another myocardial infarction and a fourth stent was placed in the coronary arteries. On the day of discharge unfortunately she developed a TIA however work-up were negative. Her medical history include insulin-dependent diabetes mellitus, Graves' disease, seizures and half to 1 pack cigarette smoking for 45 years. She quit smoking in June 2022. She is a retired . ROS:  As per HPI.     Past Medical History:   Past Medical History:   Diagnosis Date    Bulging of intervertebral disc between L4 and L5     Diabetes mellitus type 2, uncomplicated ([facility] [address] 5/12/2516    Essential hypertension 2/10/2016    Mixed hyperlipidemia 2/10/2016    Seizures ([facility] [address]     Seizures ([facility] [address]     Grand mal    Type II or unspecified type diabetes mellitus without mention of complication, not stated as uncontrolled        Past Surgical History:    Past Surgical History:   Procedure Laterality Date    APPENDECTOMY      HYSTERECTOMY      HYSTERECTOMY      ovaries out    HYSTERECTOMY, TOTAL ABDOMINAL      TONSILLECTOMY          Allergies:     Allergies   Allergen Reactions    Morphine And Related Other (See Comments)     seizures    Metformin And Related     Morphine        Medications:  [unfilled]    Social History:    Social History     Tobacco Use    Smoking status: Every Day     Packs/day: 0.75     Types: Cigarettes     Last attempt to quit: 2013     Years since quittin.8    Smokeless tobacco: Never    Tobacco comments:     chantix   Substance Use Topics    Alcohol use: No    Drug use: No       Family History:    Family History   Problem Relation Age of Onset    Heart Disease Mother     Diabetes Father     Colon Cancer Brother        Pertinent Labs:    Lab Results   Component Value Date    WBC 6.8 2019    WBC 7.9 2019    WBC 8.6 2018    HGB 13.0 2019    HGB 13.7 2019    HGB 14.0 2018     2019     2019     2018     Lab Results   Component Value Date     2019     2019     2018     Lab Results   Component Value Date    K 4.3 2019    K 4.9 2019    K 5.0 2018     Lab Results   Component Value Date    CREATININE 0.7 2019    CREATININE 0.7 2019    CREATININE 0.5 2018     Lab Results   Component Value Date    ALT 22 2019    ALT 18 2018    ALT 46 (H) 06/15/2018     Lab Results   Component Value Date    AST 35 (H) 2019    AST 28 2018    AST 29 06/15/2018     No results found for: LDH    Staging:    Cancer Staging  No matching staging information was found for the patient. Review of Systems:     Negative otherwise noted in HPI    Physical Exam:      General Appearance:  Alert, cooperative, no distress, appears stated age   Head:  Normocephalic, without obvious abnormality, atraumatic   Eyes:  PERRL, conjunctiva/corneas clear, EOM's intact, fundi benign, both eyes   Ears:  Normal TM's and external ear canals, both ears   Nose: Nares normal, septum midline, mucosa normal, no drainage or sinus tenderness   Throat: Lips, mucosa, and tongue normal; teeth and gums normal   Neck: Supple, symmetrical, trachea midline, no adenopathy, thyroid: not enlarged, symmetric, no tenderness/mass/nodules, no carotid bruit or JVD   Back:   Symmetric, no curvature, ROM normal, no CVA tenderness   Lungs:   Clear to auscultation bilaterally, respirations unlabored   Chest Wall:  No tenderness or deformity   Heart:  Regular rate and rhythm, S1, S2 normal, no murmur, rub or gallop   Abdomen:   Soft, non-tender, bowel sounds active all four quadrants,  no masses, no organomegaly   Genitalia:  Normal male   Rectal:  Normal tone, normal prostate, no masses or tenderness;  guaiac negative stool   Extremities: Extremities normal, atraumatic, no cyanosis or edema   Pulses: 2+ and symmetric   Skin: Skin color, texture, turgor normal, no rashes or lesions   Lymph nodes: Cervical, supraclavicular, and axillary nodes normal   Neurologic: Normal     Assessment:  Mrs. Shelbi Evans is a pleasant 40-year-old lady, with a past medical history significant for insulin-dependent diabetes mellitus, CAD, and Graves' disease, who was diagnosed with poorly differentiated invasive adenocarcinoma of the right middle lobe, status post 1 wedge resection, complete lobectomy with lymphadenectomy on 7/20/2022, she has stage IIb, [id number] disease.     Plan:  I discussed with the patient and her spouse melanosis, prognosis, and recommendations for treatment with adjuvant chemo, cisplatin and Alimta every 21 days for 4 cycles followed by adjuvant immunotherapy with atezolizumab for 1 year, the side effects and the schedule of the treatment were reviewed with the patient. She will have a chemo teach ,surveillance guidelines were reviewed with the patient. RTC with chemo start.     Electronically signed by Josefina Alcaraz MD on 8/29/22 at 4:45 PM UMU Horton MD   HEMATOLOGY/MEDICAL [address] [address] [address]  [address] [address]  Dept: [facility]: [phone]

## 2022-08-30 ENCOUNTER — TELEPHONE (OUTPATIENT)
Dept: CASE MANAGEMENT | Age: 62
End: 2022-08-30

## 2022-08-30 ENCOUNTER — TELEPHONE (OUTPATIENT)
Dept: ONCOLOGY | Age: 62
End: 2022-08-30

## 2022-08-30 DIAGNOSIS — C34.91 ADENOCARCINOMA OF RIGHT LUNG (HCC): Primary | ICD-10-CM

## 2022-08-30 RX ORDER — FOLIC ACID 1 MG/1
1 TABLET ORAL DAILY
Qty: 90 TABLET | Refills: 1 | Status: SHIPPED | OUTPATIENT
Start: 2022-08-30

## 2022-08-30 NOTE — TELEPHONE ENCOUNTER
Contacted pt at request of nurse navigator as introduction to oncology social work. Pt is 78-year-old female being managed for lung cancer. Pt discussed recent health concerns including cancer dx and cardiac issues. She discussed fears surrounding her health status as well as severe anxiety due to feeling as if she is not in control of her health. Stated that her  frequently travels due to work and expressed concerns that something will happen to her when he is gone. Pt noted that she has been more tearful than normal.  Indicated that she does have strong support system but noted that she does not find the type of support they are providing at this time to be helpful. Provided support and discussed boundary setting and communication surrounding her cancer dx. She was able to provide examples of coping strategies she has used and noted primary method as \"keeping busy. \"  Explored use of mindfulness strategies to remain present focused. Provided information on oncology counseling services; pt declined at this time indicating that she hopes she begins to feel better after initiating of chemotherapy. Pt expressed gratitude for call and indicated that she will notify this provider if she wishes to pursue services.       FREDDY Weinberg, LISW-S  Oncology Social Worker

## 2022-09-01 ENCOUNTER — TELEPHONE (OUTPATIENT)
Dept: INFUSION THERAPY | Age: 62
End: 2022-09-01

## 2022-09-01 NOTE — TELEPHONE ENCOUNTER
Referral received from nurse navigator d/t poor appetite and new lung cancer diagnosis. [facility] status. She reports her usual weight was 115-120# for the past 5 years, but since May she has gained weight to 132#. Discussed with increased metabolic needs with diagnosis and upcoming treatment, that weight gain over past few months is a positive. Weight maintenance is the goal. She reports eating small meals 2 times per day and does not take supplements. She reports good fluid intake, drinks water often. She is not taking oral nutrition supplements on a regular basis, but has had Glucerna in the past. She said she was going to stock up on Glucerna prior to starting treatment. She agreed to have resources and coupon sent via mail. The following resources were included: Nutrition Therapy for Cancer Related Side Effects, High Calorie/Protein Snacks, Diabetic Friendly Supplements and Diabetic Friendly Hydration Options, Coupons and contact information. Encouraged her to contact this clinician if questions arise. Will continue to follow.

## 2022-09-02 ENCOUNTER — TELEPHONE (OUTPATIENT)
Dept: ONCOLOGY | Age: 62
End: 2022-09-02

## 2022-09-06 ENCOUNTER — HOSPITAL ENCOUNTER (EMERGENCY)
Age: 62
Discharge: HOME | End: 2022-09-07
Attending: EMERGENCY MEDICINE
Payer: COMMERCIAL

## 2022-09-06 ENCOUNTER — APPOINTMENT (OUTPATIENT)
Dept: GENERAL RADIOLOGY | Age: 62
End: 2022-09-06
Payer: COMMERCIAL

## 2022-09-06 VITALS
HEART RATE: 71 BPM | DIASTOLIC BLOOD PRESSURE: 77 MMHG | TEMPERATURE: 98.7 F | RESPIRATION RATE: 21 BRPM | OXYGEN SATURATION: 96 % | SYSTOLIC BLOOD PRESSURE: 124 MMHG

## 2022-09-06 DIAGNOSIS — R07.89 ATYPICAL CHEST PAIN: Primary | ICD-10-CM

## 2022-09-06 LAB
ALBUMIN SERPL-MCNC: 4.9 G/DL (ref 3.5–5.2)
ALP BLD-CCNC: 120 U/L (ref 35–104)
ALT SERPL-CCNC: 23 U/L (ref 0–32)
ANION GAP SERPL CALCULATED.3IONS-SCNC: 13 MMOL/L (ref 7–16)
AST SERPL-CCNC: 25 U/L (ref 0–31)
BASOPHILS ABSOLUTE: 0.05 E9/L (ref 0–0.2)
BASOPHILS RELATIVE PERCENT: 0.7 % (ref 0–2)
BILIRUB SERPL-MCNC: 1.3 MG/DL (ref 0–1.2)
BILIRUBIN DIRECT: 0.3 MG/DL (ref 0–0.3)
BILIRUBIN, INDIRECT: 1 MG/DL (ref 0–1)
BUN BLDV-MCNC: 21 MG/DL (ref 6–23)
CALCIUM SERPL-MCNC: 10.1 MG/DL (ref 8.6–10.2)
CHLORIDE BLD-SCNC: 101 MMOL/L (ref 98–107)
CO2: 21 MMOL/L (ref 22–29)
CREAT SERPL-MCNC: 0.9 MG/DL (ref 0.5–1)
EOSINOPHILS ABSOLUTE: 0.28 E9/L (ref 0.05–0.5)
EOSINOPHILS RELATIVE PERCENT: 4.2 % (ref 0–6)
GFR AFRICAN AMERICAN: >60
GFR NON-AFRICAN AMERICAN: >60 ML/MIN/1.73
GLUCOSE BLD-MCNC: 137 MG/DL (ref 74–99)
HCT VFR BLD CALC: 42.7 % (ref 34–48)
HEMOGLOBIN: 13.8 G/DL (ref 11.5–15.5)
IMMATURE GRANULOCYTES #: 0.03 E9/L
IMMATURE GRANULOCYTES %: 0.4 % (ref 0–5)
LIPASE: 34 U/L (ref 13–60)
LYMPHOCYTES ABSOLUTE: 1.71 E9/L (ref 1.5–4)
LYMPHOCYTES RELATIVE PERCENT: 25.4 % (ref 20–42)
MCH RBC QN AUTO: 29.9 PG (ref 26–35)
MCHC RBC AUTO-ENTMCNC: 32.3 % (ref 32–34.5)
MCV RBC AUTO: 92.4 FL (ref 80–99.9)
MONOCYTES ABSOLUTE: 0.56 E9/L (ref 0.1–0.95)
MONOCYTES RELATIVE PERCENT: 8.3 % (ref 2–12)
NEUTROPHILS ABSOLUTE: 4.1 E9/L (ref 1.8–7.3)
NEUTROPHILS RELATIVE PERCENT: 61 % (ref 43–80)
PDW BLD-RTO: 13.2 FL (ref 11.5–15)
PLATELET # BLD: 301 E9/L (ref 130–450)
PMV BLD AUTO: 9.7 FL (ref 7–12)
POTASSIUM REFLEX MAGNESIUM: 3.9 MMOL/L (ref 3.5–5)
RBC # BLD: 4.62 E12/L (ref 3.5–5.5)
SARS-COV-2, NAAT: NOT DETECTED
SODIUM BLD-SCNC: 135 MMOL/L (ref 132–146)
TOTAL PROTEIN: 8.7 G/DL (ref 6.4–8.3)
TROPONIN, HIGH SENSITIVITY: 18 NG/L (ref 0–9)
TROPONIN, HIGH SENSITIVITY: 19 NG/L (ref 0–9)
WBC # BLD: 6.7 E9/L (ref 4.5–11.5)

## 2022-09-06 PROCEDURE — 80048 BASIC METABOLIC PNL TOTAL CA: CPT

## 2022-09-06 PROCEDURE — A4216 STERILE WATER/SALINE, 10 ML: HCPCS | Performed by: STUDENT IN AN ORGANIZED HEALTH CARE EDUCATION/TRAINING PROGRAM

## 2022-09-06 PROCEDURE — 85025 COMPLETE CBC W/AUTO DIFF WBC: CPT

## 2022-09-06 PROCEDURE — 84484 ASSAY OF TROPONIN QUANT: CPT

## 2022-09-06 PROCEDURE — 80076 HEPATIC FUNCTION PANEL: CPT

## 2022-09-06 PROCEDURE — 2580000003 HC RX 258: Performed by: STUDENT IN AN ORGANIZED HEALTH CARE EDUCATION/TRAINING PROGRAM

## 2022-09-06 PROCEDURE — 99285 EMERGENCY DEPT VISIT HI MDM: CPT

## 2022-09-06 PROCEDURE — 36415 COLL VENOUS BLD VENIPUNCTURE: CPT

## 2022-09-06 PROCEDURE — 6370000000 HC RX 637 (ALT 250 FOR IP): Performed by: STUDENT IN AN ORGANIZED HEALTH CARE EDUCATION/TRAINING PROGRAM

## 2022-09-06 PROCEDURE — 87635 SARS-COV-2 COVID-19 AMP PRB: CPT

## 2022-09-06 PROCEDURE — 96374 THER/PROPH/DIAG INJ IV PUSH: CPT

## 2022-09-06 PROCEDURE — 83690 ASSAY OF LIPASE: CPT

## 2022-09-06 PROCEDURE — 71045 X-RAY EXAM CHEST 1 VIEW: CPT

## 2022-09-06 PROCEDURE — 81001 URINALYSIS AUTO W/SCOPE: CPT

## 2022-09-06 PROCEDURE — 2500000003 HC RX 250 WO HCPCS: Performed by: STUDENT IN AN ORGANIZED HEALTH CARE EDUCATION/TRAINING PROGRAM

## 2022-09-06 PROCEDURE — 93005 ELECTROCARDIOGRAM TRACING: CPT | Performed by: STUDENT IN AN ORGANIZED HEALTH CARE EDUCATION/TRAINING PROGRAM

## 2022-09-06 RX ORDER — ASPIRIN 81 MG/1
162 TABLET, CHEWABLE ORAL ONCE
Status: COMPLETED | OUTPATIENT
Start: 2022-09-06 | End: 2022-09-06

## 2022-09-06 RX ADMIN — ASPIRIN 81 MG CHEWABLE TABLET 162 MG: 81 TABLET CHEWABLE at 21:27

## 2022-09-06 RX ADMIN — Medication: at 21:28

## 2022-09-06 RX ADMIN — FAMOTIDINE 20 MG: 10 INJECTION, SOLUTION INTRAVENOUS at 21:30

## 2022-09-06 ASSESSMENT — ENCOUNTER SYMPTOMS
VOMITING: 0
NAUSEA: 0
COUGH: 0
SHORTNESS OF BREATH: 0
SINUS PRESSURE: 0
WHEEZING: 0
BACK PAIN: 0
EYE PAIN: 0
EYE REDNESS: 0
DIARRHEA: 1
ABDOMINAL DISTENTION: 0
SORE THROAT: 0
EYE DISCHARGE: 0

## 2022-09-07 LAB
BACTERIA: ABNORMAL /HPF
BILIRUBIN URINE: NEGATIVE
BLOOD, URINE: ABNORMAL
CLARITY: CLEAR
COLOR: ABNORMAL
EKG ATRIAL RATE: 65 BPM
EKG P AXIS: 65 DEGREES
EKG P-R INTERVAL: 162 MS
EKG Q-T INTERVAL: 420 MS
EKG QRS DURATION: 82 MS
EKG QTC CALCULATION (BAZETT): 436 MS
EKG R AXIS: 66 DEGREES
EKG T AXIS: 50 DEGREES
EKG VENTRICULAR RATE: 65 BPM
GLUCOSE URINE: NEGATIVE MG/DL
KETONES, URINE: NEGATIVE MG/DL
LEUKOCYTE ESTERASE, URINE: NEGATIVE
NITRITE, URINE: NEGATIVE
PH UA: 6 (ref 5–9)
PROTEIN UA: NEGATIVE MG/DL
RBC UA: ABNORMAL /HPF (ref 0–2)
SPECIFIC GRAVITY UA: <=1.005 (ref 1–1.03)
UROBILINOGEN, URINE: 0.2 E.U./DL
WBC UA: ABNORMAL /HPF (ref 0–5)

## 2022-09-07 RX ORDER — LIDOCAINE HYDROCHLORIDE 20 MG/ML
5 SOLUTION OROPHARYNGEAL PRN
Qty: 30 ML | Refills: 0 | Status: SHIPPED | OUTPATIENT
Start: 2022-09-07

## 2022-09-07 RX ORDER — LIDOCAINE HYDROCHLORIDE 20 MG/ML
5 SOLUTION OROPHARYNGEAL PRN
Qty: 30 ML | Refills: 0 | Status: SHIPPED | OUTPATIENT
Start: 2022-09-07 | End: 2022-09-07

## 2022-09-07 RX ORDER — FAMOTIDINE 20 MG/1
20 TABLET, FILM COATED ORAL 2 TIMES DAILY
Qty: 60 TABLET | Refills: 3 | Status: SHIPPED | OUTPATIENT
Start: 2022-09-07 | End: 2022-09-07

## 2022-09-07 RX ORDER — FAMOTIDINE 20 MG/1
20 TABLET, FILM COATED ORAL 2 TIMES DAILY
Qty: 60 TABLET | Refills: 3 | Status: SHIPPED | OUTPATIENT
Start: 2022-09-07

## 2022-09-07 NOTE — ED PROVIDER NOTES
The history is provided by the patient and medical records. 76-year-old female presents to the emergency department complaint of epigastric/chest pain. States she is concerned about a heart attack she has had 2 heart attacks in the past has 4 stents in place. Describes the pain as a burning sensation that goes up through the middle of her chest.  Denies any radiation of the pain denies any shortness of breath. Does elicit having some diarrhea with it. Symptoms started last night. Progressively gotten slightly worse. Otherwise denies any shortness of breath denies any fevers or chills. Otherwise elicits a foul smell to her urine otherwise no other acute complaints. The patient presents with chest pain that has been going on for 1 day. These symptoms are moderate in severity. Symptoms are made better by nothing. Symptoms are made worse by nothing. Associated symptoms include none. Review of Systems   Constitutional:  Negative for chills and fever. HENT:  Negative for ear pain, sinus pressure and sore throat. Eyes:  Negative for pain, discharge and redness. Respiratory:  Negative for cough, shortness of breath and wheezing. Cardiovascular:  Positive for chest pain. Gastrointestinal:  Positive for diarrhea. Negative for abdominal distention, nausea and vomiting. Genitourinary:  Negative for dysuria and frequency. Musculoskeletal:  Negative for arthralgias and back pain. Skin:  Negative for rash and wound. Neurological:  Negative for weakness and headaches. Hematological:  Negative for adenopathy. All other systems reviewed and are negative. Physical Exam  Vitals and nursing note reviewed. Constitutional:       Appearance: Normal appearance. She is normal weight. HENT:      Head: Normocephalic and atraumatic. Eyes:      Conjunctiva/sclera: Conjunctivae normal.   Cardiovascular:      Rate and Rhythm: Normal rate and regular rhythm. Pulses: Normal pulses. Heart sounds: Normal heart sounds. No murmur heard. No gallop. Pulmonary:      Effort: Pulmonary effort is normal. No respiratory distress. Breath sounds: Normal breath sounds. No wheezing or rales. Abdominal:      General: Abdomen is flat. Bowel sounds are normal. There is no distension. Palpations: Abdomen is soft. Tenderness: There is no abdominal tenderness. There is no guarding. Musculoskeletal:         General: No swelling or deformity. Skin:     General: Skin is warm and dry. Capillary Refill: Capillary refill takes less than 2 seconds. Neurological:      General: No focal deficit present. Mental Status: She is alert and oriented to person, place, and time. Psychiatric:         Mood and Affect: Mood normal.         Thought Content: Thought content normal.        Procedures     MDM     Amount and/or Complexity of Data Reviewed  Clinical lab tests: reviewed  Tests in the radiology section of CPT®: reviewed       70-year-old female presents emergency department complaining of epigastric/chest pain. Symptoms going on for the past several days. Worsening this evening. Described as burning. Laboratory work-up troponin slight elevation however delta less than 3 EKG was stable no acute ST elevation or depression patient symptoms resolved following Pepcid and GI cocktail. Symptoms appear to be possibly from GERD and due to patient's cardiac work-up being reassuring she is safe to be discharged home to follow-up with her family doctor at this time. She is agreeable this plan. Patient safe for discharge from the emergency department. Did advise on return precautions to the emergency department. Did also advise follow-up with her primary care physician. Patient agreeable with the plan moving forward. ED Course as of 10/05/22 1208   Tue Sep 06, 2022   2204 Patient feeling much better following GI cocktail and Pepcid symptoms are resolved at this time.  [CB]      ED Course User Index  [CB] Catherine Olivera MD       EKG: This EKG is signed by emergency department physician. Rate: 65  Rhythm: Sinus  Interpretation: No acute ST elevation depression sinus rhythm normal axis stable intervals  Comparison: stable as compared to patient's most recent EKG and no previous EKG available         ED Course as of 10/05/22 1208   Tue Sep 06, 2022   2205 Patient feeling much better following GI cocktail and Pepcid symptoms are resolved at this time. [CB]      ED Course User Index  [CB] Catherine Olivera MD       --------------------------------------------- PAST HISTORY ---------------------------------------------  Past Medical History:  has a past medical history of Adenocarcinoma of right lung ([address] Anxiety, Bulging of intervertebral disc between L4 and L5, Cataracts, bilateral, Diabetes mellitus type 2, uncomplicated ([address] Essential hypertension, Graves disease, Hearing loss, Hypothyroidism, Mixed hyperlipidemia, Myocardial infarction ([address] Neuropathy, Seizures ([address] Seizures ([address] TIA (transient ischemic attack), and Type II or unspecified type diabetes mellitus without mention of complication, not stated as uncontrolled. Past Surgical History:  has a past surgical history that includes Hysterectomy; Appendectomy; Tonsillectomy; Hysterectomy; Hysterectomy, total abdominal; and Coronary stent placement (08/2022). Social History:  reports that she quit smoking about 3 months ago. Her smoking use included cigarettes. She has a 33.75 pack-year smoking history. She has been exposed to tobacco smoke. She has never used smokeless tobacco. She reports that she does not drink alcohol and does not use drugs. Family History: family history includes Asthma in her grandchild; Breast Cancer in her maternal grandmother; Cancer in her maternal aunt and paternal aunt;  Colon Cancer in her brother; Diabetes in her father and paternal grandmother; Heart Attack in her maternal grandfather; Heart Troponin   Result Value Ref Range    Troponin, High Sensitivity 19 (H) 0 - 9 ng/L   Hepatic Function Panel   Result Value Ref Range    Total Protein 8.7 (H) 6.4 - 8.3 g/dL    Albumin 4.9 3.5 - 5.2 g/dL    Alkaline Phosphatase 120 (H) 35 - 104 U/L    ALT 23 0 - 32 U/L    AST 25 0 - 31 U/L    Total Bilirubin 1.3 (H) 0.0 - 1.2 mg/dL    Bilirubin, Direct 0.3 0.0 - 0.3 mg/dL    Bilirubin, Indirect 1.0 0.0 - 1.0 mg/dL   Lipase   Result Value Ref Range    Lipase 34 13 - 60 U/L   Urinalysis with Microscopic   Result Value Ref Range    Color, UA Straw Straw/Yellow    Clarity, UA Clear Clear    Glucose, Ur Negative Negative mg/dL    Bilirubin Urine Negative Negative    Ketones, Urine Negative Negative mg/dL    Specific Gravity, UA <=1.005 1.005 - 1.030    Blood, Urine TRACE (A) Negative    pH, UA 6.0 5.0 - 9.0    Protein, UA Negative Negative mg/dL    Urobilinogen, Urine 0.2 <2.0 E.U./dL    Nitrite, Urine Negative Negative    Leukocyte Esterase, Urine Negative Negative    WBC, UA 0-1 0 - 5 /HPF    RBC, UA 0-1 0 - 2 /HPF    Bacteria, UA NONE SEEN None Seen /HPF   Troponin   Result Value Ref Range    Troponin, High Sensitivity 18 (H) 0 - 9 ng/L       Radiology:  XR CHEST PORTABLE   Final Result   No pneumonia or pleural effusion.             ------------------------- NURSING NOTES AND VITALS REVIEWED ---------------------------  Date / Time Roomed:  9/6/2022  8:38 PM  ED Bed Assignment:  13/13    The nursing notes within the ED encounter and vital signs as below have been reviewed. /77   Pulse 71   Temp 98.7 °F (37.1 °C) (Oral)   Resp 21   SpO2 96%   Oxygen Saturation Interpretation: Normal      ------------------------------------------ PROGRESS NOTES ------------------------------------------  1:09 AM EDT  I have spoken with the patient and discussed todays results, in addition to providing specific details for the plan of care and counseling regarding the diagnosis and prognosis.   Their questions are answered at this time and they are agreeable with the plan. I discussed at length with them reasons for immediate return here for re evaluation. They will followup with their primary care physician by calling their office on Monday.      --------------------------------- ADDITIONAL PROVIDER NOTES ---------------------------------  At this time the patient is without objective evidence of an acute process requiring hospitalization or inpatient management. They have remained hemodynamically stable throughout their entire ED visit and are stable for discharge with outpatient follow-up. The plan has been discussed in detail and they are aware of the specific conditions for emergent return, as well as the importance of follow-up. Discharge Medication List as of 9/7/2022 12:27 AM        START taking these medications    Details   famotidine (PEPCID) 20 MG tablet Take 1 tablet by mouth 2 times daily, Disp-60 tablet, R-3Normal      lidocaine viscous hcl (XYLOCAINE) 2 % SOLN solution Take 5 mLs by mouth as needed for Irritation or Pain, Disp-30 mL, R-0Normal             Diagnosis:  1. Atypical chest pain        Disposition:  Patient's disposition: Discharge to home  Patient's condition is stable. Paulina Squires MD  Resident  09/07/22 2051      ATTENDING PROVIDER ATTESTATION:     I have personally performed and/or participated in the history, exam, medical decision making, and procedures and agree with all pertinent clinical information. I have also reviewed and agree with the past medical, family and social history unless otherwise noted. I have discussed this patient in detail with the resident, and provided the instruction and education regarding chest pain, epigastric abdominal pain, diarrhea and acute coronary syndrome. My findings/Plan: Heart RRR. Lungs CTA bilaterally. Abdomen soft, nontender. Bowel sounds normal. Supportive care. Discharge for outpatient follow up.        [address] [facility],   10/05/22 [address]

## 2022-09-08 ENCOUNTER — PROCEDURE VISIT (OUTPATIENT)
Dept: AUDIOLOGY | Age: 62
End: 2022-09-08
Payer: COMMERCIAL

## 2022-09-08 DIAGNOSIS — C34.91 ADENOCARCINOMA OF RIGHT LUNG (HCC): Primary | ICD-10-CM

## 2022-09-08 PROCEDURE — 92557 COMPREHENSIVE HEARING TEST: CPT | Performed by: AUDIOLOGIST

## 2022-09-08 PROCEDURE — 92567 TYMPANOMETRY: CPT | Performed by: AUDIOLOGIST

## 2022-09-08 PROCEDURE — 92588 EVOKED AUDITORY TST COMPLETE: CPT | Performed by: AUDIOLOGIST

## 2022-09-09 ENCOUNTER — HOSPITAL ENCOUNTER (OUTPATIENT)
Dept: INFUSION THERAPY | Age: 62
End: 2022-09-09

## 2022-09-12 ENCOUNTER — TELEPHONE (OUTPATIENT)
Dept: CASE MANAGEMENT | Age: 62
End: 2022-09-12

## 2022-09-12 NOTE — TELEPHONE ENCOUNTER
Informed Dr. Shirlene Whitlock patient had called on 09/09/2022 canceling all her upcoming clinic appointments not wanting to pursue cancer treatments per the clinic . She stated she was not aware of patient's decision and asked to have patient come in for an office visit. Contacted patient, she stated she sent Dr. Shirlene Whitlock a message in My Chart on 09/08/2022 regarding her decision for her plan of care, then called clinic on 09/09/2022. Informed her Dr. Shirlene Whitlock asked if she'd come in only for an office visit, patient was agreeable. She is scheduled to see Dr. Shirlene Whitlock on 09/15/2022 at 1315. Will update Dr. Shirlene Whitlock and Jed Lorenzana, [facility]. Eric Pyle  RN, ADN, BSE, OCN  Patient Nurse Navigator

## 2022-09-12 NOTE — PROGRESS NOTES
Patient had not arrived for 1 PM medication teaching. Inquired with schedulers if they had received any phone calls from patient. Fox Maty, stated the patient had called and told her she decided not to move forward with any treatments. Nurse navigator, Rafael Mcfadden, was contacted to confirm this information. She was unaware of this decision and was going to contact Dr. Stefani Ervin to get additional information.

## 2022-09-15 ENCOUNTER — SCHEDULED TELEPHONE ENCOUNTER (OUTPATIENT)
Dept: ONCOLOGY | Age: 62
End: 2022-09-15
Payer: COMMERCIAL

## 2022-09-15 ENCOUNTER — TELEPHONE (OUTPATIENT)
Dept: INFUSION THERAPY | Age: 62
End: 2022-09-15

## 2022-09-15 ENCOUNTER — HOSPITAL ENCOUNTER (OUTPATIENT)
Dept: INFUSION THERAPY | Age: 62
End: 2022-09-15

## 2022-09-15 DIAGNOSIS — C34.91 ADENOCARCINOMA OF RIGHT LUNG (HCC): Primary | ICD-10-CM

## 2022-09-15 PROCEDURE — 99213 OFFICE O/P EST LOW 20 MIN: CPT | Performed by: INTERNAL MEDICINE

## 2022-09-15 NOTE — TELEPHONE ENCOUNTER
call place to Dr. Jose Antonio Perrin and this patient wants all scans and follow ups at [facility].

## 2022-09-15 NOTE — PROGRESS NOTES
[address] ONCOLOGY  [facility] [address]  Dept: [phone]  Loc: [phone]  Progress note    A virtual/Doxy visit was performed due to the COVID-19 pandemic. The patient had consented to the encounter. Patient identification was verified at the start of the visit, including her telephone number and physical location, the patient was at home. I was in the office. Reason for Visit: NSCLC. Referring Physician: Dr. Lynsey Jc     PCP:  Dr. Gaudencio Kidd    History of Present Illness:     Mireille Barkley is an 59 y.o.  female with a past medical history significant for insulin-dependent diabetes mellitus, and Graves' disease, who was referred by Dr. Lynsey Jc [facility] for non-small cell lung cancer: The patient was seen by Dr. Harjit Rowell in August 2020 following a work-up for right inguinal adenopathy that returned benign on excisional biopsy. During that work-up we also obtained a CAT scan of the chest which showed sub-centimeter nodular opacities measuring less than 5 mm for which was referred to the lung cancer screening program.    She underwent CAT scan of the abdomen and pelvis in May 2022 for abdominal pain and while this did not show any acute process in the abdomen or pelvis there was report of indeterminate 1.7 cm subpleural lung nodule in the medial right middle lobe, mildly increased in size since prior CT dated 6/26/2020. She subsequently underwent PET/CT which showed FDG avid right middle lobe pulmonary nodule, concerning for possible hypermetabolic neoplasm. No hypermetabolic mass, adenopathy, or   fluid collection. MRI brain was negative for metastatic disease.  On 7/20/2022 she underwent right middle lobe wedge resection and completion lobectomy with final pathology [id number], AJCC eighth stage IIb    Since her lung surgery she suffered 2 MIs in early and mid August 2022 and underwent a total of 4 coronary artery stent placements. She is currently on aspirin and Brilinta and established cardiology care with Dr. Aubrey Parker at [facility].    Recall she also has a history of diabetes treated with subcu insulin and Graves' disease for which she follows Dr. Elbert Vick, endocrinologist at [facility]. She is making good recovery following her recent cardiac events and here with results of her final pathology. 7/20/2022  FINAL DIAGNOSIS   A, B. Lung, right middle lobe, wedge resection and completion lobectomy:  - Adenocarcinoma (2.1 cm), solid predominant (80%), with additional micropapillary (10%) and acinar (10%) patterns (See comment and synoptic template). - Visceral pleural invasion present. - Margins negative. C. Lymph node, 8R, excision:   - Negative for tumor (0/1). D. Lymph node, 9R, excision:   - Negative for tumor (0/1). E. Lymph node, level 7, excision:  - Negative for tumor (0/1). F. Lymph node, level 10R, excision:   - Metastatic adenocarcinoma (1/1). - Extranodal extension present. G. Lymph node, 11R, excision:   - Negative for tumor (0/1). H. Lymph node, 2R, excision:  - Negative for tumor (0/1). I. Lymph node, 4R, excision:   - Negative for tumor (0/1).      LUNG  8th Edition - Protocol posted: 7/27/2021  LUNG, RESECTION - All Specimens  SPECIMEN   Procedure Wedge resection   Completion lobectomy   Specimen Laterality Right   TUMOR   Tumor Focality Single focus   Tumor Site Middle lobe of lung   Tumor Size   Total Tumor Size (size of entire tumor) Greatest Dimension (Centimeters): 2.1 cm   Additional Dimension (Centimeters) 1.8 cm   1.3 cm   Histologic Type Invasive solid adenocarcinoma   Histologic Patterns Present Acinar: 10   Solid: 80   Micropapillary: 10   Histologic Grade G3, poorly differentiated   Visceral Pleura Invasion Present   Direct Invasion of Adjacent Structures Not applicable (no adjacent structures present)   Treatment Effect No known presurgical therapy Lymphovascular Invasion Present   MARGINS   Margin Status for Invasive Carcinoma All margins negative for invasive carcinoma   Closest Margin(s) to Invasive Carcinoma Cannot be determined: prior wedge resection   Distance from Invasive Carcinoma to Closest Margin Cannot be determined: prior wedge resection   Margin Status for Non-Invasive Tumor Not applicable   REGIONAL LYMPH NODES   Lymph Node(s) from Prior Procedures No known prior lymph node sampling performed   Regional Lymph Node Status Tumor present in regional lymph node(s)   Number of Lymph Nodes with Tumor 1   [place] Site(s) with Tumor 10R: Hilar   Extranodal Extension Present   Number of Lymph Nodes Examined 7   [place] Site(s) Examined 2R: Upper paratracheal   4R: Lower paratracheal   8R: Para-esophageal (below nadiya)   9R: Pulmonary ligament   10R: Hilar   11R: Interlobar   7: Subcarinal     BRAF - No variant detected [Reference Sequence: ([id number])]. EGFR - No variant detected [Reference Sequence: ([id number])]. HER2 (ERBB2) - No variant detected [Reference Sequence:   ([id number])]. KRAS - No variant detected [Reference Sequence: ([id number])]. MET - No variant detected [Reference Sequence: (NM_[id number])]. RET Rearrangement 2% (0-14%)  ROS1 Rearrangement 4% (0-14%)   PD-L1 by IHC 5%  ALK by IHC negative     Post surgery she developed right-sided pleural effusion and was in the hospital for fluid drainage via pigtail catheter. After discharge from the hospital she developed chest pain and was diagnosed with myocardial infarction. He had 3 stent placed and was discharged home. 1 day after she developed another myocardial infarction and a fourth stent was placed in the coronary arteries. On the day of discharge unfortunately she developed a TIA however work-up were negative. Her medical history include insulin-dependent diabetes mellitus, Graves' disease, seizures and half to 1 pack cigarette smoking for 45 years.  She quit smoking in 2022. She is a retired . She is feeling great at this time. ROS:  As per HPI. Past Medical History:   Past Medical History:   Diagnosis Date    Adenocarcinoma of right lung ([facility] 2022    Anxiety     Bulging of intervertebral disc between L4 and L5     Cataracts, bilateral     initial stage    Diabetes mellitus type 2, uncomplicated ([address]     Essential hypertension 02/10/2016    Graves disease     Hearing loss     Hypothyroidism     Mixed hyperlipidemia 02/10/2016    Myocardial infarction ([facility]     x2    Neuropathy     Seizures ([facility]     Seizures ([facility]     Grand mal    TIA (transient ischemic attack)     Type II or unspecified type diabetes mellitus without mention of complication, not stated as uncontrolled        Past Surgical History:    Past Surgical History:   Procedure Laterality Date    APPENDECTOMY      CORONARY STENT PLACEMENT  2022    HYSTERECTOMY (CERVIX STATUS UNKNOWN)      HYSTERECTOMY (CERVIX STATUS UNKNOWN)      ovaries out    HYSTERECTOMY, TOTAL ABDOMINAL (CERVIX REMOVED)      TONSILLECTOMY          Allergies: Allergies   Allergen Reactions    Metformin And Related      Fainted and lowered blood sugar.     Morphine Anaphylaxis    Morphine And Related Other (See Comments)     seizures       Medications:  [unfilled]    Social History:    Social History     Tobacco Use    Smoking status: Former     Packs/day: 0.75     Years: 45.00     Pack years: 33.75     Types: Cigarettes     Quit date: 6/3/2022     Years since quittin.2     Passive exposure: Past    Smokeless tobacco: Never    Tobacco comments:     chantix   Vaping Use    Vaping Use: Never used   Substance Use Topics    Alcohol use: No    Drug use: No       Family History:    Family History   Problem Relation Age of Onset    High Blood Pressure Mother     Heart Disease Mother     Diabetes Father     Obesity Sister     High Blood Pressure Sister     Colon Cancer Brother     Heart Disease Maternal Aunt     Cancer Maternal Aunt         patient doesn't know primary. Cancer Paternal Aunt         Hodgkin's    No Known Problems Paternal Uncle     Breast Cancer Maternal Grandmother     Heart Attack Maternal Grandfather     Heart Disease Paternal Grandmother     Diabetes Paternal Grandmother     No Known Problems Maternal Cousin     No Known Problems Paternal Cousin     No Known Problems Son     Other Daughter         Shaila Right    Asthma Grandchild        Pertinent Labs:    Lab Results   Component Value Date    WBC 6.7 09/06/2022    WBC 6.8 05/23/2019    WBC 7.9 05/07/2019    HGB 13.8 09/06/2022    HGB 13.0 05/23/2019    HGB 13.7 05/07/2019     09/06/2022     05/23/2019     05/07/2019     Lab Results   Component Value Date     09/06/2022     05/23/2019     05/07/2019     Lab Results   Component Value Date    K 3.9 09/06/2022    K 4.3 05/23/2019    K 4.9 05/07/2019     Lab Results   Component Value Date    CREATININE 0.9 09/06/2022    CREATININE 0.7 05/23/2019    CREATININE 0.7 05/07/2019     Lab Results   Component Value Date    ALT 23 09/06/2022    ALT 22 05/23/2019    ALT 18 09/14/2018     Lab Results   Component Value Date    AST 25 09/06/2022    AST 35 (H) 05/23/2019    AST 28 09/14/2018     No results found for: LDH    Physical Exam:  General appearance: patient is awake and alert,  not in acute distress. Assessment:  Mrs. Tu Treadwell is a pleasant 69-year-old lady, with a past medical history significant for insulin-dependent diabetes mellitus, CAD, and Graves' disease, who was diagnosed with poorly differentiated invasive adenocarcinoma of the right middle lobe, status post 1 wedge resection, complete lobectomy with lymphadenectomy on 7/20/2022, she has stage IIb, [id number] disease.     Plan:  I discussed with the patient diagnosis, prognosis, and recommendations for treatment with adjuvant chemo, cisplatin and Alimta every 21 days for 4 cycles followed

## 2022-10-11 NOTE — PROGRESS NOTES
This patient was referred for Ototoxic Monitoring. Therefore, baselines were established for audiometric thresholds. In order to monitor hearing changes in a timely manner, testing included high frequency audiometry,  per [employer] Guidelines. Testing was accomplished using an AudioStar Pro Clinical Audiometer and utilizing calibrated ear phones/insert earphones. Extra time to test high frequencies was 15 minutes. Audiometry using pure tone air and bone conduction testing revealed a mild  sensorineural hearing loss, bilaterally. Audiometric responses were obtained at 9000-2000Hz, bilaterally, with the exception of 64912Cd, left ear and [id number], right ear. Reliability was good. Speech reception thresholds were in good agreement with the pure tone averages, bilaterally. Speech discrimination scores were 96%, bilaterally at [address] [place] [address]. Tympanometry revealed normal middle ear peak pressure and compliance, bilaterally. Ipsilateral acoustic reflexes were absent, bilaterally at 1000Hz. Distortion Product Otoacoustic Emission (DPOAE) testing was performed bilaterally. On the right, cochlear responses were obtained at 1500; 2000, 3500 and 4000Hz. . On the left, cochlear responses were obtained at 1500; 2000; 2500; 3500 and 4000Hz. The results were reviewed with the patient. Repeat testing is suggested with any of the following symptoms: tinnitus, aural fullness, decreased hearing or vertigo.       Abbie Mccartyh CCC/KYRIE  Audiologist  [id number]  NPI#:  [id number]      Electronically signed by Ofelia Mccabe on 10/11/2022 at 2:24 PM

## 2022-10-17 ENCOUNTER — APPOINTMENT (OUTPATIENT)
Dept: CT IMAGING | Age: 62
End: 2022-10-17
Payer: COMMERCIAL

## 2022-10-17 ENCOUNTER — HOSPITAL ENCOUNTER (EMERGENCY)
Age: 62
Discharge: HOME | End: 2022-10-17
Attending: EMERGENCY MEDICINE
Payer: COMMERCIAL

## 2022-10-17 VITALS
DIASTOLIC BLOOD PRESSURE: 80 MMHG | SYSTOLIC BLOOD PRESSURE: 153 MMHG | OXYGEN SATURATION: 100 % | HEART RATE: 63 BPM | RESPIRATION RATE: 18 BRPM | TEMPERATURE: 98.8 F

## 2022-10-17 DIAGNOSIS — R07.9 CHEST PAIN, UNSPECIFIED TYPE: Primary | ICD-10-CM

## 2022-10-17 DIAGNOSIS — G89.18 POST-OP PAIN: ICD-10-CM

## 2022-10-17 LAB
ANION GAP SERPL CALCULATED.3IONS-SCNC: 11 MMOL/L (ref 7–16)
BUN BLDV-MCNC: 11 MG/DL (ref 6–23)
CALCIUM SERPL-MCNC: 9.8 MG/DL (ref 8.6–10.2)
CHLORIDE BLD-SCNC: 102 MMOL/L (ref 98–107)
CHP ED QC CHECK: NORMAL
CO2: 24 MMOL/L (ref 22–29)
CREAT SERPL-MCNC: 0.7 MG/DL (ref 0.5–1)
EKG ATRIAL RATE: 60 BPM
EKG P AXIS: 70 DEGREES
EKG P-R INTERVAL: 166 MS
EKG Q-T INTERVAL: 420 MS
EKG QRS DURATION: 80 MS
EKG QTC CALCULATION (BAZETT): 420 MS
EKG R AXIS: 48 DEGREES
EKG T AXIS: 76 DEGREES
EKG VENTRICULAR RATE: 60 BPM
GFR AFRICAN AMERICAN: >60
GFR NON-AFRICAN AMERICAN: >60 ML/MIN/1.73
GLUCOSE BLD-MCNC: 118 MG/DL
GLUCOSE BLD-MCNC: 118 MG/DL (ref 74–99)
HCT VFR BLD CALC: 43.5 % (ref 34–48)
HEMOGLOBIN: 14.3 G/DL (ref 11.5–15.5)
MCH RBC QN AUTO: 30.4 PG (ref 26–35)
MCHC RBC AUTO-ENTMCNC: 32.9 % (ref 32–34.5)
MCV RBC AUTO: 92.4 FL (ref 80–99.9)
PDW BLD-RTO: 13.1 FL (ref 11.5–15)
PLATELET # BLD: 255 E9/L (ref 130–450)
PMV BLD AUTO: 9.9 FL (ref 7–12)
POTASSIUM SERPL-SCNC: 4.5 MMOL/L (ref 3.5–5)
RBC # BLD: 4.71 E12/L (ref 3.5–5.5)
SODIUM BLD-SCNC: 137 MMOL/L (ref 132–146)
TROPONIN, HIGH SENSITIVITY: 13 NG/L (ref 0–9)
WBC # BLD: 6.7 E9/L (ref 4.5–11.5)

## 2022-10-17 PROCEDURE — 80048 BASIC METABOLIC PNL TOTAL CA: CPT

## 2022-10-17 PROCEDURE — 99285 EMERGENCY DEPT VISIT HI MDM: CPT | Performed by: EMERGENCY MEDICINE

## 2022-10-17 PROCEDURE — 85027 COMPLETE CBC AUTOMATED: CPT

## 2022-10-17 PROCEDURE — 96374 THER/PROPH/DIAG INJ IV PUSH: CPT | Performed by: EMERGENCY MEDICINE

## 2022-10-17 PROCEDURE — 71275 CT ANGIOGRAPHY CHEST: CPT

## 2022-10-17 PROCEDURE — 93005 ELECTROCARDIOGRAM TRACING: CPT | Performed by: EMERGENCY MEDICINE

## 2022-10-17 PROCEDURE — 6360000002 HC RX W HCPCS: Performed by: EMERGENCY MEDICINE

## 2022-10-17 PROCEDURE — 6360000004 HC RX CONTRAST MEDICATION: Performed by: RADIOLOGY

## 2022-10-17 PROCEDURE — 84484 ASSAY OF TROPONIN QUANT: CPT

## 2022-10-17 RX ORDER — FENTANYL CITRATE 0.05 MG/ML
50 INJECTION, SOLUTION INTRAMUSCULAR; INTRAVENOUS ONCE
Status: COMPLETED | OUTPATIENT
Start: 2022-10-17 | End: 2022-10-17

## 2022-10-17 RX ADMIN — IOPAMIDOL 75 ML: 755 INJECTION, SOLUTION INTRAVENOUS at 14:14

## 2022-10-17 RX ADMIN — FENTANYL CITRATE 50 MCG: 50 INJECTION INTRAMUSCULAR; INTRAVENOUS at 14:24

## 2022-10-17 ASSESSMENT — ENCOUNTER SYMPTOMS
SHORTNESS OF BREATH: 1
ABDOMINAL DISTENTION: 0
NAUSEA: 0
VOMITING: 0
EYE DISCHARGE: 0
SINUS PRESSURE: 0
SPUTUM PRODUCTION: 0
HEMOPTYSIS: 0
BACK PAIN: 0
EYE PAIN: 0
COUGH: 1
SORE THROAT: 0
EYE REDNESS: 0
DIARRHEA: 0
ABDOMINAL PAIN: 0
WHEEZING: 0

## 2022-10-17 ASSESSMENT — PAIN SCALES - GENERAL
PAINLEVEL_OUTOF10: 8
PAINLEVEL_OUTOF10: 9

## 2022-10-17 NOTE — ED PROVIDER NOTES
Patient reports 3 months ago, she underwent right middle lobectomy secondary to cancer. She reports shortly thereafter, she suffered 2 myocardial infarctions within the span of 2 days at the hospital.  She also had undergo what sounds like thoracentesis. She states she has been recovering fine at home until 2 days ago, she began having right-sided chest pain that feels like it is coming from deep inside. She reports shortness of breath. No diaphoresis. No fever or chills. Pain on the right side is worse with deep inspiration or cough. Shortness of Breath  Severity:  Mild  Onset quality:  Sudden  Duration:  2 days  Timing:  Constant  Progression:  Worsening  Chronicity:  New  Relieved by:  None tried  Worsened by:  Exertion  Ineffective treatments:  None tried  Associated symptoms: chest pain and cough    Associated symptoms: no abdominal pain, no fever, no headaches, no hemoptysis, no rash, no sore throat, no sputum production, no vomiting and no wheezing    Risk factors: hx of cancer       Review of Systems   Constitutional:  Negative for chills and fever. HENT:  Negative for sinus pressure and sore throat. Eyes:  Negative for pain, discharge and redness. Respiratory:  Positive for cough and shortness of breath. Negative for hemoptysis, sputum production and wheezing. Cardiovascular:  Positive for chest pain. Gastrointestinal:  Negative for abdominal distention, abdominal pain, diarrhea, nausea and vomiting. Genitourinary:  Negative for dysuria and frequency. Musculoskeletal:  Negative for arthralgias and back pain. Skin:  Negative for rash and wound. Neurological:  Negative for weakness and headaches. Hematological:  Negative for adenopathy. All other systems reviewed and are negative. Physical Exam  Vitals and nursing note reviewed. Constitutional:       Appearance: She is well-developed. HENT:      Head: Normocephalic and atraumatic.    Eyes:      Pupils: Pupils are equal, round, and reactive to light. Cardiovascular:      Rate and Rhythm: Normal rate and regular rhythm. Heart sounds: Normal heart sounds. No murmur heard. Pulmonary:      Effort: Pulmonary effort is normal. No tachypnea, bradypnea, accessory muscle usage or respiratory distress. Breath sounds: Examination of the right-upper field reveals decreased breath sounds. Examination of the right-middle field reveals decreased breath sounds. Decreased breath sounds present. No wheezing or rales. Abdominal:      General: Bowel sounds are normal.      Palpations: Abdomen is soft. Tenderness: There is no abdominal tenderness. There is no guarding or rebound. Musculoskeletal:      Cervical back: Normal range of motion and neck supple. Skin:     General: Skin is warm and dry. Neurological:      Mental Status: She is alert and oriented to person, place, and time. Cranial Nerves: No cranial nerve deficit. Coordination: Coordination normal.        Procedures     MDM       EKG: This EKG is signed and interpreted by me. Rate: 60  Rhythm: Sinus  Interpretation: no acute changes  Comparison: stable as compared to patient's most recent EKG      Heart Score for Risk of Major Adverse Cardiac Events    HISTORY  Slightly suspicious  +0    EKG  Normal   +0    AGE  45-65    +1    RISK FACTORS*  > or = 3 risk factors or +2    history of atherosclerotic    disease. TROPONIN  Within lormal range  +0    Total    3    *Risk factors  Risk factors: HTN, hypercholesterolemia, DM, obesity (BMI >30 kg/m²), smoking (current, or smoking cessation =3 mo), positive family history (parent or sibling with CVD before age 72); atherosclerotic disease: prior MI, PCI/CABG, CVA/TIA, or peripheral arterial disease    INTERPRETATION  0-3: 0.9-1.7% risk of adverse cardiac event. In the HEART Score, these patients were discharged. (0.99% retrospective)  (1.7% prospective)  4-6: 12-16.6% risk of adverse cardiac event. In the HEART Score, these patients were admitted to the hospital.    (11.6% retrospective) (16.6% prospective)  > or =7: 50-65% risk of adverse cardiac event. In the HEART Score, these patients were candidates for early invasive  measures.    (65.2% retrospective) (50.1% prospective)    MACE (Major Adverse Cardiac Events) is defined as: all-cause mortality, myocardial infarction, or coronary revascularization.              --------------------------------------------- PAST HISTORY ---------------------------------------------  Past Medical History:  has a past medical history of Adenocarcinoma of right lung ([address] Anxiety, Bulging of intervertebral disc between L4 and L5, Cataracts, bilateral, Diabetes mellitus type 2, uncomplicated ([address] Essential hypertension, Graves disease, Hearing loss, Hypothyroidism, Mixed hyperlipidemia, Myocardial infarction ([address] Neuropathy, Seizures ([address] Seizures ([address] TIA (transient ischemic attack), and Type II or unspecified type diabetes mellitus without mention of complication, not stated as uncontrolled. Past Surgical History:  has a past surgical history that includes Hysterectomy; Appendectomy; Tonsillectomy; Hysterectomy; Hysterectomy, total abdominal; and Coronary stent placement (08/2022). Social History:  reports that she quit smoking about 4 months ago. Her smoking use included cigarettes. She has a 33.75 pack-year smoking history. She has been exposed to tobacco smoke. She has never used smokeless tobacco. She reports that she does not drink alcohol and does not use drugs. Family History: family history includes Asthma in her grandchild; Breast Cancer in her maternal grandmother; Cancer in her maternal aunt and paternal aunt;  Colon Cancer in her brother; Diabetes in her father and paternal grandmother; Heart Attack in her maternal grandfather; Heart Disease in her maternal aunt, mother, and paternal grandmother; High Blood Pressure in her mother and sister; No Known Problems in her maternal cousin, paternal cousin, paternal uncle, and son; Obesity in her sister; Other in her daughter. The patients home medications have been reviewed. Allergies: Metformin and related, Morphine, and Morphine and related    -------------------------------------------------- RESULTS -------------------------------------------------  Labs:  Results for orders placed or performed during the hospital encounter of 79/90/10   Basic metabolic panel   Result Value Ref Range    Sodium 137 132 - 146 mmol/L    Potassium 4.5 3.5 - 5.0 mmol/L    Chloride 102 98 - 107 mmol/L    CO2 24 22 - 29 mmol/L    Anion Gap 11 7 - 16 mmol/L    Glucose 118 (H) 74 - 99 mg/dL    BUN 11 6 - 23 mg/dL    Creatinine 0.7 0.5 - 1.0 mg/dL    GFR Non-African American >60 >=60 mL/min/1.73    GFR African American >60     Calcium 9.8 8.6 - 10.2 mg/dL   CBC   Result Value Ref Range    WBC 6.7 4.5 - 11.5 E9/L    RBC 4.71 3.50 - 5.50 E12/L    Hemoglobin 14.3 11.5 - 15.5 g/dL    Hematocrit 43.5 34.0 - 48.0 %    MCV 92.4 80.0 - 99.9 fL    MCH 30.4 26.0 - 35.0 pg    MCHC 32.9 32.0 - 34.5 %    RDW 13.1 11.5 - 15.0 fL    Platelets 211 624 - 640 E9/L    MPV 9.9 7.0 - 12.0 fL   Troponin   Result Value Ref Range    Troponin, High Sensitivity 13 (H) 0 - 9 ng/L   POCT Glucose   Result Value Ref Range    Glucose 118 mg/dL    QC OK? ok    EKG 12 Lead   Result Value Ref Range    Ventricular Rate 60 BPM    Atrial Rate 60 BPM    P-R Interval 166 ms    QRS Duration 80 ms    Q-T Interval 420 ms    QTc Calculation (Bazett) 420 ms    P Axis 70 degrees    R Axis 48 degrees    T Axis 76 degrees       Radiology:  CTA PULMONARY W CONTRAST   Final Result   1. There is no pulmonary embolus. 2. There is no evidence of pneumonia   3. Postop or pleuroparenchymal scarring seen within the right lung base   laterally, given the history of recent right middle lobe resection these are   expected postoperative changes   4.  Trace right pleural effusion. RECOMMENDATIONS:   Unavailable             ------------------------- NURSING NOTES AND VITALS REVIEWED ---------------------------  Date / Time Roomed:  10/17/2022 11:59 AM  ED Bed Assignment:  17/17    The nursing notes within the ED encounter and vital signs as below have been reviewed. BP (!) 153/80   Pulse 63   Temp 98.8 °F (37.1 °C)   Resp 18   SpO2 100%   Oxygen Saturation Interpretation: Normal      ------------------------------------------ PROGRESS NOTES ------------------------------------------  2:58 PM EDT  I have spoken with the patient and discussed todays results, in addition to providing specific details for the plan of care and counseling regarding the diagnosis and prognosis. Their questions are answered at this time and they are agreeable with the plan. I discussed at length with them reasons for immediate return here for re evaluation. They will followup with their primary care physician by calling their office tomorrow. --------------------------------- ADDITIONAL PROVIDER NOTES ---------------------------------  At this time the patient is without objective evidence of an acute process requiring hospitalization or inpatient management. They have remained hemodynamically stable throughout their entire ED visit and are stable for discharge with outpatient follow-up. The plan has been discussed in detail and they are aware of the specific conditions for emergent return, as well as the importance of follow-up. New Prescriptions    No medications on file       Diagnosis:  1. Chest pain, unspecified type    2. Post-op pain        Disposition:  Patient's disposition: Discharge to home  Patient's condition is stable.        Pelon Badillo DO  10/17/22 1456

## 2022-11-29 ENCOUNTER — TELEPHONE (OUTPATIENT)
Dept: CASE MANAGEMENT | Age: 62
End: 2022-11-29

## 2022-11-29 NOTE — TELEPHONE ENCOUNTER
Patient called stating she was seen at the [facility] for her lung adenocarcinoma and their treatment recommendations were the same as Dr. Cleve Snyder. She stated she'd like to make an appointment with Dr. Demetri Siddiqui to discuss a plan of care that can be done locally. Informed her the clinic  will call her with the 1st available appointment, she verbalized understanding. Zana Wellington RN, ADN, BSE, OCN  Patient Nurse Navigator

## 2022-12-05 ENCOUNTER — OFFICE VISIT (OUTPATIENT)
Dept: ONCOLOGY | Age: 62
End: 2022-12-05
Payer: COMMERCIAL

## 2022-12-05 ENCOUNTER — HOSPITAL ENCOUNTER (OUTPATIENT)
Dept: INFUSION THERAPY | Age: 62
Discharge: HOME | End: 2022-12-05

## 2022-12-05 VITALS
WEIGHT: 132.1 LBS | DIASTOLIC BLOOD PRESSURE: 80 MMHG | HEART RATE: 64 BPM | BODY MASS INDEX: 24.31 KG/M2 | SYSTOLIC BLOOD PRESSURE: 131 MMHG | TEMPERATURE: 97.5 F | OXYGEN SATURATION: 100 % | HEIGHT: 62 IN

## 2022-12-05 DIAGNOSIS — C34.91 ADENOCARCINOMA OF RIGHT LUNG (HCC): Primary | ICD-10-CM

## 2022-12-05 PROCEDURE — 99214 OFFICE O/P EST MOD 30 MIN: CPT | Performed by: INTERNAL MEDICINE

## 2022-12-05 PROCEDURE — 3078F DIAST BP <80 MM HG: CPT | Performed by: INTERNAL MEDICINE

## 2022-12-05 PROCEDURE — G8427 DOCREV CUR MEDS BY ELIG CLIN: HCPCS | Performed by: INTERNAL MEDICINE

## 2022-12-05 PROCEDURE — G8484 FLU IMMUNIZE NO ADMIN: HCPCS | Performed by: INTERNAL MEDICINE

## 2022-12-05 PROCEDURE — G8420 CALC BMI NORM PARAMETERS: HCPCS | Performed by: INTERNAL MEDICINE

## 2022-12-05 PROCEDURE — 99213 OFFICE O/P EST LOW 20 MIN: CPT

## 2022-12-05 PROCEDURE — 3074F SYST BP LT 130 MM HG: CPT | Performed by: INTERNAL MEDICINE

## 2022-12-05 PROCEDURE — 1036F TOBACCO NON-USER: CPT | Performed by: INTERNAL MEDICINE

## 2022-12-05 PROCEDURE — 3017F COLORECTAL CA SCREEN DOC REV: CPT | Performed by: INTERNAL MEDICINE

## 2022-12-05 NOTE — PROGRESS NOTES
[facility] ONCOLOGY  [facility] [address]  Dept: [facility]: [phone]  Progress note    Reason for Visit: NSCLC. Referring Physician: Dr. Monica Bal     PCP:  Dr. Bismark Thacker    History of Present Illness:     Zoey Morales is an 57 y.o.  female with a past medical history significant for insulin-dependent diabetes mellitus, and Graves' disease, who was referred by Dr. Monica Bal [facility] for non-small cell lung cancer: The patient was seen by Dr. Jie Mcknight in August 2020 following a work-up for right inguinal adenopathy that returned benign on excisional biopsy. During that work-up we also obtained a CAT scan of the chest which showed sub-centimeter nodular opacities measuring less than 5 mm for which was referred to the lung cancer screening program.    She underwent CAT scan of the abdomen and pelvis in May 2022 for abdominal pain and while this did not show any acute process in the abdomen or pelvis there was report of indeterminate 1.7 cm subpleural lung nodule in the medial right middle lobe, mildly increased in size since prior CT dated 6/26/2020. She subsequently underwent PET/CT which showed FDG avid right middle lobe pulmonary nodule, concerning for possible hypermetabolic neoplasm. No hypermetabolic mass, adenopathy, or   fluid collection. MRI brain was negative for metastatic disease. On 7/20/2022 she underwent right middle lobe wedge resection and completion lobectomy with final pathology [id number], AJCC eighth stage IIb    Since her lung surgery she suffered 2 MIs in early and mid August 2022 and underwent a total of 4 coronary artery stent placements.  She is currently on aspirin and Brilinta and established cardiology care [place]. [place].    Recall she also has a history of diabetes treated with subcu insulin and Graves' disease for which she follows  [facility], endocrinologist at [facility]. She is making good recovery following her recent cardiac events and here with results of her final pathology. 7/20/2022  FINAL DIAGNOSIS   A, B. Lung, right middle lobe, wedge resection and completion lobectomy:  - Adenocarcinoma (2.1 cm), solid predominant (80%), with additional micropapillary (10%) and acinar (10%) patterns (See comment and synoptic template). - Visceral pleural invasion present. - Margins negative. C. Lymph node, 8R, excision:   - Negative for tumor (0/1). D. Lymph node, 9R, excision:   - Negative for tumor (0/1). E. Lymph node, level 7, excision:  - Negative for tumor (0/1). F. Lymph node, level 10R, excision:   - Metastatic adenocarcinoma (1/1). - Extranodal extension present. G. Lymph node, 11R, excision:   - Negative for tumor (0/1). H. Lymph node, 2R, excision:  - Negative for tumor (0/1). I. Lymph node, 4R, excision:   - Negative for tumor (0/1).      LUNG  8th Edition - Protocol posted: 7/27/2021  LUNG, RESECTION - All Specimens  SPECIMEN   Procedure Wedge resection   Completion lobectomy   Specimen Laterality Right   TUMOR   Tumor Focality Single focus   Tumor Site Middle lobe of lung   Tumor Size   Total Tumor Size (size of entire tumor) Greatest Dimension (Centimeters): 2.1 cm   Additional Dimension (Centimeters) 1.8 cm   1.3 cm   Histologic Type Invasive solid adenocarcinoma   Histologic Patterns Present Acinar: 10   Solid: 80   Micropapillary: 10   Histologic Grade G3, poorly differentiated   Visceral Pleura Invasion Present   Direct Invasion of Adjacent Structures Not applicable (no adjacent structures present)   Treatment Effect No known presurgical therapy   Lymphovascular Invasion Present   MARGINS   Margin Status for Invasive Carcinoma All margins negative for invasive carcinoma   Closest Margin(s) to Invasive Carcinoma Cannot be determined: prior wedge resection   Distance from Invasive Carcinoma to Closest Margin Cannot be determined: prior wedge resection   Margin Status for Non-Invasive Tumor Not applicable   REGIONAL LYMPH NODES   Lymph Node(s) from Prior Procedures No known prior lymph node sampling performed   Regional Lymph Node Status Tumor present in regional lymph node(s)   Number of Lymph Nodes with Tumor 1   [place] Site(s) with Tumor 10R: Hilar   Extranodal Extension Present   Number of Lymph Nodes Examined 7   [place] Site(s) Examined 2R: Upper paratracheal   4R: Lower paratracheal   8R: Para-esophageal (below nadiya)   9R: Pulmonary ligament   10R: Hilar   11R: Interlobar   7: Subcarinal     BRAF - No variant detected [Reference Sequence: (NM_004333.4)]. EGFR - No variant detected [Reference Sequence: ([id number])]. HER2 (ERBB2) - No variant detected [Reference Sequence:   ([id number])]. KRAS - No variant detected [Reference Sequence: (NM_[id number])]. MET - No variant detected [Reference Sequence: (NM_000254.2)]. RET Rearrangement 2% (0-14%)  ROS1 Rearrangement 4% (0-14%)   PD-L1 by IHC 5%  ALK by IHC negative     Post surgery she developed right-sided pleural effusion and was in the hospital for fluid drainage via pigtail catheter. After discharge from the hospital she developed chest pain and was diagnosed with myocardial infarction. He had 3 stent placed and was discharged home. 1 day after she developed another myocardial infarction and a fourth stent was placed in the coronary arteries. On the day of discharge unfortunately she developed a TIA however work-up were negative. Her medical history include insulin-dependent diabetes mellitus, Graves' disease, seizures and half to 1 pack cigarette smoking for 45 years. She quit smoking in June 2022. She is a retired .      The patient had a chest CT scan done on 11/10/2022, was seen by oncology at [facility], she has indeterminate 8 and 5 mm nodule along the right major fissure, favored to represent a fissural lymph node, few small pulmonary nodules, stable, no new or progressive thoracic lymphadenopathy, the patient was recommended follow-up chest CT after 3 months. She is feeling tired, she has chronic shortness of breath, she has been having pain in the right chest, she had cardiac work-up recently, including stress test was negative. The pain is improving, it is likely related to the surgery. Review of Systems;  CONSTITUTIONAL: No fever, chills. Fair appetite, positive for fatigue. ENMT: Eyes: No diplopia; Nose: No epistaxis. Mouth: No sore throat. RESPIRATORY: No hemoptysis, positive for shortness of breath, cough. CARDIOVASCULAR: No cardiac type chest pain, palpitations. GASTROINTESTINAL: No nausea/vomiting, abdominal pain. GENITOURINARY: No dysuria, urinary frequency, hematuria. NEURO: No syncope, presyncope, headache. Remainder:  ROS NEGATIVE    ROS:  As per HPI. Past Medical History:   Past Medical History:   Diagnosis Date    Adenocarcinoma of right lung ([address] 8/29/2022    Anxiety     Bulging of intervertebral disc between L4 and L5     Cataracts, bilateral     initial stage    Diabetes mellitus type 2, uncomplicated ([address] 95/74/8914    Essential hypertension 02/10/2016    Graves disease     Hearing loss     Hypothyroidism     Mixed hyperlipidemia 02/10/2016    Myocardial infarction ([address]     x2    Neuropathy     Seizures ([address]     Seizures ([address]     Grand mal    TIA (transient ischemic attack)     Type II or unspecified type diabetes mellitus without mention of complication, not stated as uncontrolled        Past Surgical History:    Past Surgical History:   Procedure Laterality Date    APPENDECTOMY      CORONARY STENT PLACEMENT  08/2022    HYSTERECTOMY (CERVIX STATUS UNKNOWN)      HYSTERECTOMY (CERVIX STATUS UNKNOWN)      ovaries out    HYSTERECTOMY, TOTAL ABDOMINAL (CERVIX REMOVED)      TONSILLECTOMY          Allergies: Allergies   Allergen Reactions    Metformin And Related      Fainted and lowered blood sugar. Morphine Anaphylaxis    Morphine And Related Other (See Comments)     seizures       Medications:  [unfilled]    Social History:    Social History     Tobacco Use    Smoking status: Former     Packs/day: 0.75     Years: 45.00     Pack years: 33.75     Types: Cigarettes     Quit date: 6/3/2022     Years since quittin.5     Passive exposure: Past    Smokeless tobacco: Never    Tobacco comments:     chantix   Vaping Use    Vaping Use: Never used   Substance Use Topics    Alcohol use: No    Drug use: No       Family History:    Family History   Problem Relation Age of Onset    High Blood Pressure Mother     Heart Disease Mother     Diabetes Father     Obesity Sister     High Blood Pressure Sister     Colon Cancer Brother     Heart Disease Maternal Aunt     Cancer Maternal Aunt         patient doesn't know primary.     Cancer Paternal Aunt         Hodgkin's    No Known Problems Paternal Uncle     Breast Cancer Maternal Grandmother     Heart Attack Maternal Grandfather     Heart Disease Paternal Grandmother     Diabetes Paternal Grandmother     No Known Problems Maternal Cousin     No Known Problems Paternal Cousin     No Known Problems Son     Other Daughter         Richard Plants    Asthma Grandchild        Pertinent Labs:    Lab Results   Component Value Date    WBC 6.7 10/17/2022    WBC 6.7 2022    WBC 6.8 2019    HGB 14.3 10/17/2022    HGB 13.8 2022    HGB 13.0 2019     10/17/2022     2022     2019     Lab Results   Component Value Date     10/17/2022     2022     2019     Lab Results   Component Value Date    K 4.5 10/17/2022    K 3.9 2022    K 4.3 2019     Lab Results   Component Value Date    CREATININE 0.7 10/17/2022    CREATININE 0.9 2022    CREATININE 0.7 2019     Lab Results   Component Value Date    ALT 23 2022    ALT 22 2019    ALT 18 2018     Lab Results   Component Value Date    AST 25 09/06/2022    AST 35 (H) 05/23/2019    AST 28 09/14/2018     No results found for: LDH    Staging:    Cancer Staging  No matching staging information was found for the patient.     Review of Systems:     Negative otherwise noted in HPI    Physical Exam:      General Appearance:  Alert, cooperative, no distress, appears stated age   Head:  Normocephalic, without obvious abnormality, atraumatic   Eyes:  PERRL, conjunctiva/corneas clear, EOM's intact, fundi benign, both eyes   Ears:  Normal TM's and external ear canals, both ears   Nose: Nares normal, septum midline, mucosa normal, no drainage or sinus tenderness   Throat: Lips, mucosa, and tongue normal; teeth and gums normal   Neck: Supple, symmetrical, trachea midline, no adenopathy, thyroid: not enlarged, symmetric, no tenderness/mass/nodules, no carotid bruit or JVD   Back:   Symmetric, no curvature, ROM normal, no CVA tenderness   Lungs:   Clear to auscultation bilaterally, respirations unlabored   Chest Wall:  No tenderness or deformity   Heart:  Regular rate and rhythm, S1, S2 normal, no murmur, rub or gallop   Abdomen:   Soft, non-tender, bowel sounds active all four quadrants,  no masses, no organomegaly   Genitalia:  Normal male   Rectal:  Normal tone, normal prostate, no masses or tenderness;  guaiac negative stool   Extremities: Extremities normal, atraumatic, no cyanosis or edema   Pulses: 2+ and symmetric   Skin: Skin color, texture, turgor normal, no rashes or lesions   Lymph nodes: Cervical, supraclavicular, and axillary nodes normal   Neurologic: Normal     Assessment:  Mrs. Fifi Valdez is a pleasant 67-year-old lady, with a past medical history significant for insulin-dependent diabetes mellitus, CAD, and Graves' disease, who was diagnosed with poorly differentiated invasive adenocarcinoma of the right middle lobe, status post 1 wedge resection, complete lobectomy with lymphadenectomy on 7/20/2022, she has stage IIb, [id number] disease. Plan:  I discussed with the patient and her spouse melanosis, prognosis, and recommendations for treatment with adjuvant chemo, cisplatin and Alimta every 21 days for 4 cycles followed by adjuvant immunotherapy with atezolizumab for 1 year, the side effects and the schedule of the treatment were reviewed with the patient. The patient had decided against pursuing adjuvant therapy. Recommended surveillance. The patient had a chest CT scan done on 11/10/2022, was seen by oncology at [facility], she has indeterminate 8 and 5 mm nodule along the right major fissure, favored to represent a fissural lymph node, few small pulmonary nodules, stable, no new or progressive thoracic lymphadenopathy, the patient was recommended follow-up chest CT after 3 months, at least twice yearly after that. The patient is doing well clinically, labs from November were reviewed and are unremarkable, I ordered chest scan to be done in February, the patient like to have her follow-up locally, we will see her after the chest CT is done. RTC in February.     Sammie Zacarias MD   HEMATOLOGY/MEDICAL [address] [address] [address]  [address] [address]  Dept: [facility]: [phone]

## 2023-01-13 DIAGNOSIS — C34.91 ADENOCARCINOMA OF RIGHT LUNG (HCC): Primary | ICD-10-CM

## 2023-02-02 ENCOUNTER — HOSPITAL ENCOUNTER (OUTPATIENT)
Dept: INFUSION THERAPY | Age: 63
Discharge: HOME | End: 2023-02-02
Payer: COMMERCIAL

## 2023-02-02 ENCOUNTER — OFFICE VISIT (OUTPATIENT)
Dept: ONCOLOGY | Age: 63
End: 2023-02-02
Payer: COMMERCIAL

## 2023-02-02 VITALS
WEIGHT: 130.9 LBS | HEART RATE: 68 BPM | TEMPERATURE: 98.2 F | BODY MASS INDEX: 24.09 KG/M2 | DIASTOLIC BLOOD PRESSURE: 94 MMHG | OXYGEN SATURATION: 98 % | HEIGHT: 62 IN | SYSTOLIC BLOOD PRESSURE: 169 MMHG

## 2023-02-02 DIAGNOSIS — C34.91 ADENOCARCINOMA OF RIGHT LUNG (HCC): Primary | ICD-10-CM

## 2023-02-02 DIAGNOSIS — C34.91 ADENOCARCINOMA OF RIGHT LUNG (HCC): ICD-10-CM

## 2023-02-02 LAB
ALBUMIN SERPL-MCNC: 4.3 G/DL (ref 3.5–5.2)
ALP BLD-CCNC: 101 U/L (ref 35–104)
ALT SERPL-CCNC: 36 U/L (ref 0–32)
ANION GAP SERPL CALCULATED.3IONS-SCNC: 9 MMOL/L (ref 7–16)
AST SERPL-CCNC: 30 U/L (ref 0–31)
BASOPHILS ABSOLUTE: 0.02 E9/L (ref 0–0.2)
BASOPHILS RELATIVE PERCENT: 0.3 % (ref 0–2)
BILIRUB SERPL-MCNC: 1.2 MG/DL (ref 0–1.2)
BUN BLDV-MCNC: 14 MG/DL (ref 6–23)
CALCIUM SERPL-MCNC: 9.2 MG/DL (ref 8.6–10.2)
CHLORIDE BLD-SCNC: 106 MMOL/L (ref 98–107)
CO2: 25 MMOL/L (ref 22–29)
CREAT SERPL-MCNC: 0.8 MG/DL (ref 0.5–1)
EOSINOPHILS ABSOLUTE: 0.07 E9/L (ref 0.05–0.5)
EOSINOPHILS RELATIVE PERCENT: 1.2 % (ref 0–6)
GFR SERPL CREATININE-BSD FRML MDRD: >60 ML/MIN/1.73
GLUCOSE BLD-MCNC: 269 MG/DL (ref 74–99)
HCT VFR BLD CALC: 38.6 % (ref 34–48)
HEMOGLOBIN: 12.3 G/DL (ref 11.5–15.5)
IMMATURE GRANULOCYTES #: 0.02 E9/L
IMMATURE GRANULOCYTES %: 0.3 % (ref 0–5)
LYMPHOCYTES ABSOLUTE: 0.94 E9/L (ref 1.5–4)
LYMPHOCYTES RELATIVE PERCENT: 16.2 % (ref 20–42)
MCH RBC QN AUTO: 30.4 PG (ref 26–35)
MCHC RBC AUTO-ENTMCNC: 31.9 % (ref 32–34.5)
MCV RBC AUTO: 95.3 FL (ref 80–99.9)
MONOCYTES ABSOLUTE: 0.34 E9/L (ref 0.1–0.95)
MONOCYTES RELATIVE PERCENT: 5.9 % (ref 2–12)
NEUTROPHILS ABSOLUTE: 4.41 E9/L (ref 1.8–7.3)
NEUTROPHILS RELATIVE PERCENT: 76.1 % (ref 43–80)
PDW BLD-RTO: 13.4 FL (ref 11.5–15)
PLATELET # BLD: 332 E9/L (ref 130–450)
PMV BLD AUTO: 9.9 FL (ref 7–12)
POTASSIUM SERPL-SCNC: 4.3 MMOL/L (ref 3.5–5)
RBC # BLD: 4.05 E12/L (ref 3.5–5.5)
SODIUM BLD-SCNC: 140 MMOL/L (ref 132–146)
TOTAL PROTEIN: 7.2 G/DL (ref 6.4–8.3)
WBC # BLD: 5.8 E9/L (ref 4.5–11.5)

## 2023-02-02 PROCEDURE — 1036F TOBACCO NON-USER: CPT | Performed by: INTERNAL MEDICINE

## 2023-02-02 PROCEDURE — 99214 OFFICE O/P EST MOD 30 MIN: CPT | Performed by: INTERNAL MEDICINE

## 2023-02-02 PROCEDURE — 3074F SYST BP LT 130 MM HG: CPT | Performed by: INTERNAL MEDICINE

## 2023-02-02 PROCEDURE — 3078F DIAST BP <80 MM HG: CPT | Performed by: INTERNAL MEDICINE

## 2023-02-02 PROCEDURE — G8484 FLU IMMUNIZE NO ADMIN: HCPCS | Performed by: INTERNAL MEDICINE

## 2023-02-02 PROCEDURE — 85025 COMPLETE CBC W/AUTO DIFF WBC: CPT

## 2023-02-02 PROCEDURE — G8420 CALC BMI NORM PARAMETERS: HCPCS | Performed by: INTERNAL MEDICINE

## 2023-02-02 PROCEDURE — 80053 COMPREHEN METABOLIC PANEL: CPT

## 2023-02-02 PROCEDURE — 36415 COLL VENOUS BLD VENIPUNCTURE: CPT

## 2023-02-02 PROCEDURE — G8427 DOCREV CUR MEDS BY ELIG CLIN: HCPCS | Performed by: INTERNAL MEDICINE

## 2023-02-02 PROCEDURE — 3017F COLORECTAL CA SCREEN DOC REV: CPT | Performed by: INTERNAL MEDICINE

## 2023-02-02 NOTE — PROGRESS NOTES
[facility] [place] [facility] MEDICAL ONCOLOGY  [address] [facility] [address]  Dept: [phone]  Loc: [phone]  Progress note    Reason for Visit: NSCLC.     Referring Physician: Dr. Leobardo Arenas     PCP:  Dr. Kulwant Fernandes    History of Present Illness:     Devi Lema is an 62 y.o.  female with a past medical history significant for insulin-dependent diabetes mellitus, and Graves' disease, who was referred by Dr. Leobardo Garcia [facility] for non-small cell lung cancer:    The patient was seen by Dr. Garcia in August 2020 following a work-up for right inguinal adenopathy that returned benign on excisional biopsy. During that work-up we also obtained a CAT scan of the chest which showed sub-centimeter nodular opacities measuring less than 5 mm for which was referred to the lung cancer screening program.    She underwent CAT scan of the abdomen and pelvis in May 2022 for abdominal pain and while this did not show any acute process in the abdomen or pelvis there was report of indeterminate 1.7 cm subpleural lung nodule in the medial right middle lobe, mildly increased in size since prior CT dated 6/26/2020. She subsequently underwent PET/CT which showed FDG avid right middle lobe pulmonary nodule, concerning for possible hypermetabolic neoplasm. No hypermetabolic mass, adenopathy, or   fluid collection. MRI brain was negative for metastatic disease. On 7/20/2022 she underwent right middle lobe wedge resection and completion lobectomy with final pathology [id number], AJCC eighth stage IIb    Since her lung surgery she suffered 2 MIs in early and mid August 2022 and underwent a total of 4 coronary artery stent placements. She is currently on aspirin and Brilinta and established cardiology care with Dr. Butts at [facility].    Recall she also has a history of diabetes treated with subcu insulin and Graves' disease for which she follows Dr. Pedroza's, endocrinologist at  [facility]. She is making good recovery following her recent cardiac events and here with results of her final pathology. 7/20/2022  FINAL DIAGNOSIS   A, B. Lung, right middle lobe, wedge resection and completion lobectomy:  - Adenocarcinoma (2.1 cm), solid predominant (80%), with additional micropapillary (10%) and acinar (10%) patterns (See comment and synoptic template). - Visceral pleural invasion present. - Margins negative. C. Lymph node, 8R, excision:   - Negative for tumor (0/1). D. Lymph node, 9R, excision:   - Negative for tumor (0/1). E. Lymph node, level 7, excision:  - Negative for tumor (0/1). F. Lymph node, level 10R, excision:   - Metastatic adenocarcinoma (1/1). - Extranodal extension present. G. Lymph node, 11R, excision:   - Negative for tumor (0/1). H. Lymph node, 2R, excision:  - Negative for tumor (0/1). I. Lymph node, 4R, excision:   - Negative for tumor (0/1).      LUNG  8th Edition - Protocol posted: 7/27/2021  LUNG, RESECTION - All Specimens  SPECIMEN   Procedure Wedge resection   Completion lobectomy   Specimen Laterality Right   TUMOR   Tumor Focality Single focus   Tumor Site Middle lobe of lung   Tumor Size   Total Tumor Size (size of entire tumor) Greatest Dimension (Centimeters): 2.1 cm   Additional Dimension (Centimeters) 1.8 cm   1.3 cm   Histologic Type Invasive solid adenocarcinoma   Histologic Patterns Present Acinar: 10   Solid: 80   Micropapillary: 10   Histologic Grade G3, poorly differentiated   Visceral Pleura Invasion Present   Direct Invasion of Adjacent Structures Not applicable (no adjacent structures present)   Treatment Effect No known presurgical therapy   Lymphovascular Invasion Present   MARGINS   Margin Status for Invasive Carcinoma All margins negative for invasive carcinoma   Closest Margin(s) to Invasive Carcinoma Cannot be determined: prior wedge resection   Distance from Invasive Carcinoma to Closest Margin Cannot be determined: prior wedge resection   Margin Status for Non-Invasive Tumor Not applicable   REGIONAL LYMPH NODES   Lymph Node(s) from Prior Procedures No known prior lymph node sampling performed   Regional Lymph Node Status Tumor present in regional lymph node(s)   Number of Lymph Nodes with Tumor 1   [place] Site(s) with Tumor 10R: Hilar   Extranodal Extension Present   Number of Lymph Nodes Examined 7   [place] Site(s) Examined 2R: Upper paratracheal   4R: Lower paratracheal   8R: Para-esophageal (below nadiya)   9R: Pulmonary ligament   10R: Hilar   11R: Interlobar   7: Subcarinal     BRAF - No variant detected [Reference Sequence: (NM_004333.4)]. EGFR - No variant detected [Reference Sequence: ([id number])]. HER2 (ERBB2) - No variant detected [Reference Sequence:   ([id number])]. KRAS - No variant detected [Reference Sequence: (NM_[id number])]. MET - No variant detected [Reference Sequence: (NM_000254.2)]. RET Rearrangement 2% (0-14%)  ROS1 Rearrangement 4% (0-14%)   PD-L1 by IHC 5%  ALK by IHC negative     Post surgery she developed right-sided pleural effusion and was in the hospital for fluid drainage via pigtail catheter. After discharge from the hospital she developed chest pain and was diagnosed with myocardial infarction. He had 3 stent placed and was discharged home. 1 day after she developed another myocardial infarction and a fourth stent was placed in the coronary arteries. On the day of discharge unfortunately she developed a TIA however work-up were negative. Her medical history include insulin-dependent diabetes mellitus, Graves' disease, seizures and half to 1 pack cigarette smoking for 45 years. She quit smoking in June 2022. She is a retired .      The patient had a chest CT scan done on 11/10/2022, was seen by oncology at [facility], she has indeterminate 8 and 5 mm nodule along the right major fissure, favored to represent a fissural lymph node, few small pulmonary nodules, stable, no new or progressive thoracic lymphadenopathy, the patient was recommended follow-up chest CT after 3 months.  Since her last visit, the patient was admitted to [facility] with another heart attack, she had a stent placed, she was referred to F for CABG, she has a consultation scheduled in March.      Review of Systems;  CONSTITUTIONAL: No fever, chills. Fair appetite, positive for fatigue.  ENMT: Eyes: No diplopia; Nose: No epistaxis. Mouth: No sore throat.  RESPIRATORY: No hemoptysis, positive for shortness of breath, cough.   CARDIOVASCULAR: No cardiac type chest pain, palpitations.  GASTROINTESTINAL: No nausea/vomiting, abdominal pain.  GENITOURINARY: No dysuria, urinary frequency, hematuria.  NEURO: No syncope, presyncope, headache.  Remainder:  ROS NEGATIVE    ROS:  As per HPI.    Past Medical History:   Past Medical History:   Diagnosis Date    Adenocarcinoma of right lung (HCC) 8/29/2022    Anxiety     Bulging of intervertebral disc between L4 and L5     Cataracts, bilateral     initial stage    Diabetes mellitus type 2, uncomplicated ([facility]) 02/10/2016    Essential hypertension 02/10/2016    Graves disease     Hearing loss     Hypothyroidism     Mixed hyperlipidemia 02/10/2016    Myocardial infarction ([facility])     x2    Neuropathy     Seizures (HCC)     Seizures (HCC)     Grand mal    TIA (transient ischemic attack)     Type II or unspecified type diabetes mellitus without mention of complication, not stated as uncontrolled        Past Surgical History:    Past Surgical History:   Procedure Laterality Date    APPENDECTOMY      CORONARY STENT PLACEMENT  08/2022    HYSTERECTOMY (CERVIX STATUS UNKNOWN)      HYSTERECTOMY (CERVIX STATUS UNKNOWN)      ovaries out    HYSTERECTOMY, TOTAL ABDOMINAL (CERVIX REMOVED)      TONSILLECTOMY          Allergies:    Allergies   Allergen Reactions    Metformin And Related      Fainted and lowered blood sugar.    Morphine Anaphylaxis    Morphine And Related Other (See  Comments)     seizures       Medications:  [unfilled]    Social History:    Social History     Tobacco Use    Smoking status: Former     Packs/day: 0.75     Years: 45.00     Pack years: 33.75     Types: Cigarettes     Quit date: 6/3/2022     Years since quittin.6     Passive exposure: Past    Smokeless tobacco: Never    Tobacco comments:     chantix   Vaping Use    Vaping Use: Never used   Substance Use Topics    Alcohol use: No    Drug use: No       Family History:    Family History   Problem Relation Age of Onset    High Blood Pressure Mother     Heart Disease Mother     Diabetes Father     Obesity Sister     High Blood Pressure Sister     Colon Cancer Brother     Heart Disease Maternal Aunt     Cancer Maternal Aunt         patient doesn't know primary.     Cancer Paternal Aunt         Hodgkin's    No Known Problems Paternal Uncle     Breast Cancer Maternal Grandmother     Heart Attack Maternal Grandfather     Heart Disease Paternal Grandmother     Diabetes Paternal Grandmother     No Known Problems Maternal Cousin     No Known Problems Paternal Cousin     No Known Problems Son     Other Daughter         Orval Mccreedy    Asthma Grandchild        Pertinent Labs:    Lab Results   Component Value Date    WBC 5.8 2023    WBC 6.7 10/17/2022    WBC 6.7 2022    HGB 12.3 2023    HGB 14.3 10/17/2022    HGB 13.8 2022     2023     10/17/2022     2022     Lab Results   Component Value Date     2023     10/17/2022     2022     Lab Results   Component Value Date    K 4.3 2023    K 4.5 10/17/2022    K 3.9 2022     Lab Results   Component Value Date    CREATININE 0.8 2023    CREATININE 0.7 10/17/2022    CREATININE 0.9 2022     Lab Results   Component Value Date    ALT 36 (H) 2023    ALT 23 2022    ALT 22 2019     Lab Results   Component Value Date    AST 30 2023    AST 25 2022    AST 35 (H) 05/23/2019     No results found for: LDH    Staging:    Cancer Staging  No matching staging information was found for the patient. Review of Systems:     Negative otherwise noted in HPI    Physical Exam:      General Appearance:  Alert, cooperative, no distress, appears stated age   Head:  Normocephalic, without obvious abnormality, atraumatic   Eyes:  PERRL, conjunctiva/corneas clear, EOM's intact, fundi benign, both eyes   Ears:  Normal TM's and external ear canals, both ears   Nose: Nares normal, septum midline, mucosa normal, no drainage or sinus tenderness   Throat: Lips, mucosa, and tongue normal; teeth and gums normal   Neck: Supple, symmetrical, trachea midline, no adenopathy, thyroid: not enlarged, symmetric, no tenderness/mass/nodules, no carotid bruit or JVD   Back:   Symmetric, no curvature, ROM normal, no CVA tenderness   Lungs:   Clear to auscultation bilaterally, respirations unlabored   Chest Wall:  No tenderness or deformity   Heart:  Regular rate and rhythm, S1, S2 normal, no murmur, rub or gallop   Abdomen:   Soft, non-tender, bowel sounds active all four quadrants,  no masses, no organomegaly   Genitalia:  Normal male   Rectal:  Normal tone, normal prostate, no masses or tenderness;  guaiac negative stool   Extremities: Extremities normal, atraumatic, no cyanosis or edema   Pulses: 2+ and symmetric   Skin: Skin color, texture, turgor normal, no rashes or lesions   Lymph nodes: Cervical, supraclavicular, and axillary nodes normal   Neurologic: Normal     Assessment:  Mrs. Heaven Champagne is a pleasant 75-year-old lady, with a past medical history significant for insulin-dependent diabetes mellitus, CAD, and Graves' disease, who was diagnosed with poorly differentiated invasive adenocarcinoma of the right middle lobe, status post 1 wedge resection, complete lobectomy with lymphadenectomy on 7/20/2022, she has stage IIb, [id number] disease.     Plan:  I discussed with the patient and her spouse melanosis, prognosis, and recommendations for treatment with adjuvant chemo, cisplatin and Alimta every 21 days for 4 cycles followed by adjuvant immunotherapy with atezolizumab for 1 year, the side effects and the schedule of the treatment were reviewed with the patient. The patient had decided against pursuing adjuvant therapy. Recommended surveillance. The patient had a chest CT scan done on 11/10/2022, was seen by oncology at [facility], she has indeterminate 8 and 5 mm nodule along the right major fissure, favored to represent a fissural lymph node, few small pulmonary nodules, stable, no new or progressive thoracic lymphadenopathy, the patient was recommended follow-up chest CT after 3 months, at least twice yearly after that. 80 follow-up chest CT done on 1/27/2023, revealing stable postoperative appearance to the chest, without evidence for recurrent disease, 7 to 8 mm nodule along the right major fissure, stable, additional subcentimeter right lobe nodule, unchanged, no substantial intrathoracic adenopathy is appreciated, I recommended continued surveillance, will have a chest CT done in 3 months then we will reevaluate the schedule/frequency of the scans. The patient is doing well clinically, await consultation with CT surgery, labs reviewed with the patient, she has hyperglycemia, otherwise blood work is unremarkable. RTC in 3 months.     Nadege Child MD   HEMATOLOGY/MEDICAL [address] [id number] [address]  [address] [address]  Dept: Maureen: [phone]

## 2023-02-21 ENCOUNTER — HOSPITAL ENCOUNTER (EMERGENCY)
Age: 63
Discharge: HOME | End: 2023-02-21
Attending: EMERGENCY MEDICINE
Payer: COMMERCIAL

## 2023-02-21 ENCOUNTER — APPOINTMENT (OUTPATIENT)
Dept: ULTRASOUND IMAGING | Age: 63
End: 2023-02-21
Payer: COMMERCIAL

## 2023-02-21 VITALS
BODY MASS INDEX: 23.78 KG/M2 | OXYGEN SATURATION: 100 % | SYSTOLIC BLOOD PRESSURE: 152 MMHG | RESPIRATION RATE: 17 BRPM | TEMPERATURE: 98.2 F | DIASTOLIC BLOOD PRESSURE: 78 MMHG | HEART RATE: 72 BPM | WEIGHT: 130 LBS

## 2023-02-21 DIAGNOSIS — T14.8XXA BRUISE: ICD-10-CM

## 2023-02-21 DIAGNOSIS — M79.602 LEFT ARM PAIN: Primary | ICD-10-CM

## 2023-02-21 PROCEDURE — 99284 EMERGENCY DEPT VISIT MOD MDM: CPT

## 2023-02-21 PROCEDURE — 93971 EXTREMITY STUDY: CPT

## 2023-02-21 ASSESSMENT — ENCOUNTER SYMPTOMS
DIARRHEA: 0
ABDOMINAL PAIN: 0
WHEEZING: 0
SHORTNESS OF BREATH: 0
VOMITING: 0
NAUSEA: 0
BACK PAIN: 0
COUGH: 0
SORE THROAT: 0
CHEST TIGHTNESS: 0

## 2023-02-21 ASSESSMENT — PAIN - FUNCTIONAL ASSESSMENT: PAIN_FUNCTIONAL_ASSESSMENT: 0-10

## 2023-02-21 ASSESSMENT — LIFESTYLE VARIABLES: HOW OFTEN DO YOU HAVE A DRINK CONTAINING ALCOHOL: MONTHLY OR LESS

## 2023-02-21 ASSESSMENT — PAIN SCALES - GENERAL: PAINLEVEL_OUTOF10: 8

## 2023-02-21 ASSESSMENT — PAIN DESCRIPTION - LOCATION: LOCATION: ARM

## 2023-02-21 ASSESSMENT — PAIN DESCRIPTION - ORIENTATION: ORIENTATION: LEFT

## 2023-02-21 NOTE — ED PROVIDER NOTES
Chief complaint:  Left arm pain    HPI history provided by the patient  Patient presents complaint left arm pain. She had a heart cath with access through the left radial artery a few days ago developed swelling and pain to the left forearm afterwards which pretty much resolved although there is residual yellowish bruising, states that it still hurts and she believes she has a blood clot in it. She has no new chest pain, palpitations or shortness of breath. She apparently has been dealing with some short of breath or chest pain issues and has a history of cardiac disease with stent placement which is why she had a heart cath. There are no new symptoms related to that, she is only here complaining of left forearm pain with no proximal humerus or bicep area pain and no numbness, tingling, paresthesias or weakness in the left hand with no redness. No purulent drainage and no fevers. Review of Systems   Constitutional:  Negative for chills, diaphoresis, fatigue and fever. HENT:  Negative for congestion and sore throat. Respiratory:  Negative for cough, chest tightness, shortness of breath and wheezing. Cardiovascular:  Negative for chest pain, palpitations and leg swelling. Gastrointestinal:  Negative for abdominal pain, diarrhea, nausea and vomiting. Genitourinary:  Negative for dysuria, flank pain, frequency and hematuria. Musculoskeletal:  Negative for arthralgias, back pain, gait problem, joint swelling, myalgias, neck pain and neck stiffness. Skin:  Negative for rash and wound. Neurological:  Negative for dizziness, seizures, syncope, weakness, light-headedness, numbness and headaches. All other systems reviewed and are negative. Physical Exam  Vitals and nursing note reviewed. Constitutional:       General: She is awake. She is not in acute distress. Appearance: She is well-developed. She is not ill-appearing, toxic-appearing or diaphoretic.    HENT:      Head: Normocephalic and atraumatic. Eyes:      General: No scleral icterus. Pupils: Pupils are equal, round, and reactive to light. Neck:      Trachea: Trachea and phonation normal.   Cardiovascular:      Rate and Rhythm: Normal rate and regular rhythm. Heart sounds: Normal heart sounds. No murmur heard. Pulmonary:      Effort: Pulmonary effort is normal. No respiratory distress. Breath sounds: Normal breath sounds. No stridor, decreased air movement or transmitted upper airway sounds. No decreased breath sounds, wheezing, rhonchi or rales. Chest:      Chest wall: No tenderness. Abdominal:      General: Bowel sounds are normal. There is no distension. Palpations: Abdomen is soft. Tenderness: There is no abdominal tenderness. There is no right CVA tenderness, left CVA tenderness, guarding or rebound. Musculoskeletal:         General: Tenderness and signs of injury present. No swelling or deformity. Cervical back: Full passive range of motion without pain, normal range of motion and neck supple. No signs of trauma or rigidity. No spinous process tenderness or muscular tenderness. Normal range of motion. Right lower leg: No edema. Left lower leg: No edema. Comments: Left arm with no appreciable swelling or pitting edema. General bruising yellowish in appearance noted to the left forearm. Full range of motion of left wrist with mild tenderness into the forearm. Does have tenderness on palpation noted to the mid forearm radial aspect as well with no gross swelling or hematoma. Strong intact radial pulse on the left. Arms legs otherwise are neurovascular intact with no pretibial edema or calf pain. Skin:     General: Skin is warm and dry. Coloration: Skin is not cyanotic, jaundiced, mottled or pale. Findings: Bruising present. No erythema or rash. Comments: Left forearm bruising as indicated   Neurological:      General: No focal deficit present.       Mental Status: She is alert and oriented to person, place, and time. GCS: GCS eye subscore is 4. GCS verbal subscore is 5. GCS motor subscore is 6. Cranial Nerves: Cranial nerves 2-12 are intact. No cranial nerve deficit. Sensory: Sensation is intact. Motor: Motor function is intact. Coordination: Coordination is intact. Coordination normal.   Psychiatric:         Behavior: Behavior is cooperative. Procedures     MDM     History provided by: The patient  Social factors affecting care: None  Chronic conditions affecting care: Coronary disease, already on aspirin and Brilinta  Chart reviewed: None    Differential includes but not limited to: Hematoma, arm pain, DVT    Work up includes with interpretations: Ultrasound left arm read by radiology shows no DVT    Advanced directive discussion: None    Treatment in ER: None    Consultations in ER: None    Diagnosis and disposition: Arm pain, bruise      7:00 PM EST  Patient sitting in chair resting comfortably no distress. Work-up results are discussed with her. Repeat exam, no signs of compartment syndrome, arm neurovascular intact distally with full range of motion of all 5 digits and the wrist.  Compartment syndrome discussed with the patient although no clinical findings of it at this time.   She is very comfortable in home and will follow up with her doctor on an outpatient basis.                   --------------------------------------------- PAST HISTORY ---------------------------------------------  Past Medical History:  has a past medical history of Adenocarcinoma of right lung ([facility] [address] Anxiety, Bulging of intervertebral disc between L4 and L5, Cataracts, bilateral, Diabetes mellitus type 2, uncomplicated ([address] Essential hypertension, Graves disease, Hearing loss, Hypothyroidism, Mixed hyperlipidemia, Myocardial infarction ([address] Neuropathy, Seizures ([address] Seizures ([facility] [address] TIA (transient ischemic attack), and Type II or unspecified type diabetes mellitus without mention of complication, not stated as uncontrolled. Past Surgical History:  has a past surgical history that includes Hysterectomy; Appendectomy; Tonsillectomy; Hysterectomy; Hysterectomy, total abdominal; and Coronary stent placement (08/2022). Social History:  reports that she quit smoking about 8 months ago. Her smoking use included cigarettes. She has a 33.75 pack-year smoking history. She has been exposed to tobacco smoke. She has never used smokeless tobacco. She reports that she does not drink alcohol and does not use drugs. Family History: family history includes Asthma in her grandchild; Breast Cancer in her maternal grandmother; Cancer in her maternal aunt and paternal aunt; Colon Cancer in her brother; Diabetes in her father and paternal grandmother; Heart Attack in her maternal grandfather; Heart Disease in her maternal aunt, mother, and paternal grandmother; High Blood Pressure in her mother and sister; No Known Problems in her maternal cousin, paternal cousin, paternal uncle, and son; Obesity in her sister; Other in her daughter. The patients home medications have been reviewed. Allergies: Metformin and related, Morphine, and Morphine and related    -------------------------------------------------- RESULTS -------------------------------------------------  Labs:  No results found for this visit on 02/21/23. Radiology:  US DUP UPPER EXTREMITY LEFT VENOUS   Final Result   No evidence of DVT.             ------------------------- NURSING NOTES AND VITALS REVIEWED ---------------------------  Date / Time Roomed:  2/21/2023  5:38 PM  ED Bed Assignment:  25/25    The nursing notes within the ED encounter and vital signs as below have been reviewed.    BP (!) 152/78   Pulse 72   Temp 98.2 °F (36.8 °C)   Resp 17   Wt 130 lb (59 kg)   SpO2 100%   BMI 23.78 kg/m²   Oxygen Saturation Interpretation: Normal      ------------------------------------------ PROGRESS NOTES ------------------------------------------  I have spoken with the patient and discussed todays results, in addition to providing specific details for the plan of care and counseling regarding the diagnosis and prognosis. Their questions are answered at this time and they are agreeable with the plan. I discussed at length with them reasons for immediate return here for re evaluation. They will followup with primary care by calling their office tomorrow. --------------------------------- ADDITIONAL PROVIDER NOTES ---------------------------------  At this time the patient is without objective evidence of an acute process requiring hospitalization or inpatient management. They have remained hemodynamically stable throughout their entire ED visit and are stable for discharge with outpatient follow-up. The plan has been discussed in detail and they are aware of the specific conditions for emergent return, as well as the importance of follow-up. New Prescriptions    No medications on file       Diagnosis:  1. Left arm pain    2. Bruise        Disposition:  Patient's disposition: Discharge to home  Patient's condition is stable.          Seema Bowers DO  02/21/23 1906

## 2023-03-06 ENCOUNTER — TELEPHONE (OUTPATIENT)
Dept: CASE MANAGEMENT | Age: 63
End: 2023-03-06

## 2023-03-06 NOTE — TELEPHONE ENCOUNTER
Called patient to review her a [facility], Treatment Summary, a Survivorship post treatment packet prepared by the clinic dietitian and , ACS Life After Treatment booklet, a list of local resources for survivors, follow-up appointments related to her cancer diagnosis and contact information for the nurse navigator. Informed patient a copy will be faxed to her PCP, Dr. Pieter Phalen for the patient records. Informed patient she needs to have a chest CT scan at [facility] done about 1 week before her 05/22/2023 appointment with Dr. Jacek Ortiz. She stated she'll make the appointment. Encouraged patient to call the clinic prior to the next clinic appointment with questions or concerns so her appointment can be moved up, she verbalized understanding. Called Dr. Hiral Blake office, informed of the SCP and treatment plan, then faxed to [phone], fax confirmation received.  Bri Andrea RN, ADN, BSE, OCN Patient Nurse Navigator

## 2023-03-10 DIAGNOSIS — C34.91 ADENOCARCINOMA OF RIGHT LUNG (HCC): Primary | ICD-10-CM

## 2023-03-14 ENCOUNTER — HOSPITAL ENCOUNTER (EMERGENCY)
Age: 63
Discharge: HOME | End: 2023-03-14
Payer: COMMERCIAL

## 2023-03-14 VITALS
BODY MASS INDEX: 24.84 KG/M2 | WEIGHT: 135 LBS | HEIGHT: 62 IN | RESPIRATION RATE: 20 BRPM | TEMPERATURE: 99.1 F | HEART RATE: 94 BPM | OXYGEN SATURATION: 97 % | DIASTOLIC BLOOD PRESSURE: 88 MMHG | SYSTOLIC BLOOD PRESSURE: 145 MMHG

## 2023-03-14 DIAGNOSIS — U07.1 COVID-19: Primary | ICD-10-CM

## 2023-03-14 LAB
SARS-COV-2, NAAT: DETECTED
STREP GRP A PCR: NEGATIVE

## 2023-03-14 PROCEDURE — 87880 STREP A ASSAY W/OPTIC: CPT

## 2023-03-14 PROCEDURE — 99211 OFF/OP EST MAY X REQ PHY/QHP: CPT

## 2023-03-14 PROCEDURE — 87635 SARS-COV-2 COVID-19 AMP PRB: CPT

## 2023-03-14 ASSESSMENT — PAIN - FUNCTIONAL ASSESSMENT: PAIN_FUNCTIONAL_ASSESSMENT: 0-10

## 2023-03-14 ASSESSMENT — PAIN SCALES - GENERAL: PAINLEVEL_OUTOF10: 0

## 2023-03-14 NOTE — DISCHARGE INSTRUCTIONS
Take Tylenol for pain and fever  Continue inhaler    If symptoms worsen go to the emergency department.

## 2023-03-14 NOTE — ED PROVIDER NOTES
[facility] URGENT CARE  EMERGENCY DEPARTMENT ENCOUNTER        NAME: Zoey Morales  :  1960  MRN:  [id number]  Date of evaluation: 3/14/2023  Provider: Valdemar Judge PA-C  PCP: Margarito Saldivar DO  Note Started : 3:07 PM EDT 3/14/23    Chief Complaint: Generalized Body Aches, Fatigue, and Pharyngitis      This is a 68-year-old female that presents to urgent care she states that she has been having some body aches and some URI symptoms. She has had some fevers and chills that started yesterday. She states that she has just gotten over COVID several weeks ago. On first contact patient she appears to be in no acute distress. Review of Systems  Pertinent positives and negatives are stated within HPI, all other systems reviewed and are negative. Allergies: Metformin and related, Morphine, and Morphine and related     --------------------------------------------- PAST HISTORY ---------------------------------------------  Past Medical History:  has a past medical history of Adenocarcinoma of right lung ([address] Anxiety, Bulging of intervertebral disc between L4 and L5, Cataracts, bilateral, Diabetes mellitus type 2, uncomplicated ([address] Essential hypertension, Graves disease, Hearing loss, Hypothyroidism, Mixed hyperlipidemia, Myocardial infarction ([address] Neuropathy, Seizures ([address] Seizures ([address] TIA (transient ischemic attack), and Type II or unspecified type diabetes mellitus without mention of complication, not stated as uncontrolled. Past Surgical History:  has a past surgical history that includes Hysterectomy; Appendectomy; Tonsillectomy; Hysterectomy; Hysterectomy, total abdominal; and Coronary stent placement (2022). Social History:  reports that she quit smoking about 9 months ago. Her smoking use included cigarettes. She has a 33.75 pack-year smoking history. She has been exposed to tobacco smoke.  She has never used smokeless tobacco. She reports that she does not drink alcohol and does not use drugs. Family History: family history includes Asthma in her grandchild; Breast Cancer in her maternal grandmother; Cancer in her maternal aunt and paternal aunt; Colon Cancer in her brother; Diabetes in her father and paternal grandmother; Heart Attack in her maternal grandfather; Heart Disease in her maternal aunt, mother, and paternal grandmother; High Blood Pressure in her mother and sister; No Known Problems in her maternal cousin, paternal cousin, paternal uncle, and son; Obesity in her sister; Other in her daughter. The patients home medications have been reviewed. The nursing notes within the ED encounter have been reviewed. ------------------------------------------------SCREENINGS----------------------------------------------                        CIWA Assessment  BP: (!) 145/88  Heart Rate: 94           ---------------------------------------------PHYSICAL EXAM --------------------------------------------    Vitals:    03/14/23 1436   BP: (!) 145/88   Pulse: 94   Resp: 20   Temp: 99.1 °F (37.3 °C)   SpO2: 97%   Weight: 135 lb (61.2 kg)   Height: 5' 2\" (1.575 m)     Oxygen Saturation Interpretation: Normal     Physical Exam  Vitals and nursing note reviewed. Constitutional:       Appearance: She is well-developed. HENT:      Head: Normocephalic and atraumatic. Jaw: There is normal jaw occlusion. Right Ear: Hearing, ear canal and external ear normal. Tympanic membrane is bulging. Left Ear: Hearing, ear canal and external ear normal. Tympanic membrane is bulging. Nose: Rhinorrhea present. No congestion. Right Sinus: Maxillary sinus tenderness present. Left Sinus: Maxillary sinus tenderness present. Mouth/Throat:      Mouth: No angioedema. Pharynx: Posterior oropharyngeal erythema (mild) present. No pharyngeal swelling, oropharyngeal exudate or uvula swelling. Tonsils: No tonsillar exudate or tonsillar abscesses. Comments: Oropharynx is patent. Eyes:      Pupils: Pupils are equal, round, and reactive to light. Comments: She states chronically the left eyelids are more wide open than the right eyelids. Cardiovascular:      Rate and Rhythm: Normal rate and regular rhythm. Heart sounds: Normal heart sounds. Pulmonary:      Effort: Pulmonary effort is normal.      Breath sounds: Normal breath sounds. Musculoskeletal:      Cervical back: Full passive range of motion without pain, normal range of motion and neck supple. Muscular tenderness present. Lymphadenopathy:      Cervical: No cervical adenopathy. Skin:     General: Skin is warm and dry. Neurological:      Mental Status: She is alert and oriented to person, place, and time. Cranial Nerves: Cranial nerves 2-12 are intact. Sensory: Sensation is intact. Motor: Motor function is intact. Coordination: Coordination is intact. Gait: Gait is intact.       -------------------------------------------------- RESULTS -------------------------------------------------  Results for orders placed or performed during the hospital encounter of 03/14/23   COVID-19, Rapid    Specimen: Nasopharyngeal Swab   Result Value Ref Range    SARS-CoV-2, NAAT DETECTED (A) Not Detected   Strep Screen Group A Throat    Specimen: Throat   Result Value Ref Range    Strep Grp A PCR Negative Negative     No orders to display       Labs Reviewed   COVID-19, RAPID - Abnormal; Notable for the following components:       Result Value    SARS-CoV-2, NAAT DETECTED (*)     All other components within normal limits   STREP SCREEN GROUP A THROAT       When ordered only abnormal lab results are displayed. All other labs were within normal range or not returned as of this dictation. Interpretation per the Radiologist below, if available at the time of this note:    No orders to display     No results found. No results found.     -------------------------------------------------PROCEDURES--------------------------------------------  Unless otherwise noted below, none      Procedures      ---EMERGENCY DEPARTMENT COURSE and DIFFERENTIAL DIAGNOSIS/MDM---  (CC/HPI Summary, DDx, ED Course, and Reassessment:) (Disposition Considerations (include 1 Tests not done, Admit vs D/C, Shared Decision Making, Pt Expectation of Test or Tx., Consults, Social Determinants, Chronic Conditiions, Records reviewed)    MDM  Number of Diagnoses or Management Options  COVID-19  Diagnosis management comments: This is a 62-year-old female in no acute distress.  She does have some cough and cold symptoms URI symptoms.  Several weeks ago she did have COVID and recovered.  She was having similar symptoms starting yesterday and today she is tested again positive.  I do recommend taking some over-the-counter cough and cold medications I did offer her something for nausea if she had any nausea but she did want anything I did offer for her to use an inhaler but she does not want one at this time she as 1 from her previous COVID infection.  She does not want take any steroids.  She will call her specialist tomorrow because she is states that last time her specialist did give her antiviral medication.         DISCHARGE MEDICATIONS:  Discharge Medication List as of 3/14/2023  3:52 PM          DISCONTINUED MEDICATIONS:  Discharge Medication List as of 3/14/2023  3:52 PM          PATIENT REFERRED TO:  Allison Tomlin DO  [address]  [facility] [address]  [phone]    Schedule an appointment as soon as possible for a visit       --------------------------------- ADDITIONAL PROVIDER NOTES ---------------------------------  I have spoken with the patient and discussed today’s results, in addition to providing specific details for the plan of care and counseling regarding the diagnosis and prognosis.  Their questions are answered at this time and they are agreeable with the  plan.   This patient is stable for discharge. I have shared the specific conditions for return, as well as the importance of follow-up. * NOTE: (Please note that portions of this note were completed with a voice recognition program.  Efforts were made to edit the dictations but occasionally words are mis-transcribed. )    --------------------------------- IMPRESSION AND DISPOSITION ---------------------------------    IMPRESSION  1. COVID-19        DISPOSITION Decision To Discharge 03/14/2023 03:52:31 PM    Disposition: Discharge to home  Patient condition is good    I am the Primary Clinician of Record.      Deonna Laguna PA-C (electronically signed) on 3/14/2023 at [facility], [place]  03/14/23 2351

## 2023-03-24 ENCOUNTER — TELEPHONE (OUTPATIENT)
Dept: CASE MANAGEMENT | Age: 63
End: 2023-03-24

## 2023-03-24 NOTE — TELEPHONE ENCOUNTER
Patient called requesting a PET scan instead of a chest CT scan prior to her next office visit in May 2023. She said she hasn't had one in a year and is concerned a chest CT scan won't  possible cancer cells that could be in other areas of her body. Informed her Dr. Maureen Neal will be updated of her request and she'll receive a call back with an answer, she verbalized understanding. Kyra Andrea  RN, ADN, BSE, OCN  Patient Nurse Navigator

## 2023-03-27 ENCOUNTER — TELEPHONE (OUTPATIENT)
Dept: CASE MANAGEMENT | Age: 63
End: 2023-03-27

## 2023-03-27 NOTE — TELEPHONE ENCOUNTER
Dr. Brent Sinclair stated patient can have a PET scan if it's approved by her insurance, she stated the order will be placed. Attempted contacting patient with the update, no answer, left voice message to call clinic at [phone]. Cuca Mijares  RN, ADN, BSE, OCN  Patient Nurse Navigator

## 2023-03-28 ENCOUNTER — TELEPHONE (OUTPATIENT)
Dept: CASE MANAGEMENT | Age: 63
End: 2023-03-28

## 2023-03-28 DIAGNOSIS — C34.91 ADENOCARCINOMA OF RIGHT LUNG (HCC): Primary | ICD-10-CM

## 2023-03-28 DIAGNOSIS — C34.2 PRIMARY ADENOCARCINOMA OF MIDDLE LOBE OF RIGHT LUNG (HCC): ICD-10-CM

## 2023-03-28 NOTE — TELEPHONE ENCOUNTER
Patient returned call, informed Dr. Radu Avila was fine with her having a PET scan instead of a chest CT scan. Her last PET scan was done at the [facility], therefore for continuity informed her she can have [facility] in [place]. She was agreeable with this. This nurse navigator contacted [facility], they said they only do PET scan on Saturdays and will hold a 05/13/2023 at [facility] appointment for patient.  said they'll wait for this clinic to do the authorization before confirming the appointment. She said they won't cancel the 05/15/2023 chest CT scan appointment in case the insurance doesn't authorize the the PET scan.  stated she'll call patient with the instructions and mail them out to her. Returned call to patient, updated with the above information. Encouraged to call clinic with questions, she verbalized understanding.  Eulalia Kenyon  RN, ADN, BSE, OCN  Patient Nurse Navigator

## 2023-05-22 ENCOUNTER — HOSPITAL ENCOUNTER (OUTPATIENT)
Dept: INFUSION THERAPY | Age: 63
Discharge: HOME | End: 2023-05-22
Payer: COMMERCIAL

## 2023-05-22 ENCOUNTER — OFFICE VISIT (OUTPATIENT)
Dept: ONCOLOGY | Age: 63
End: 2023-05-22
Payer: COMMERCIAL

## 2023-05-22 VITALS
OXYGEN SATURATION: 98 % | TEMPERATURE: 97.9 F | WEIGHT: 138.3 LBS | DIASTOLIC BLOOD PRESSURE: 66 MMHG | HEIGHT: 62 IN | HEART RATE: 72 BPM | BODY MASS INDEX: 25.45 KG/M2 | SYSTOLIC BLOOD PRESSURE: 120 MMHG

## 2023-05-22 DIAGNOSIS — C34.91 ADENOCARCINOMA OF RIGHT LUNG (HCC): Primary | ICD-10-CM

## 2023-05-22 LAB
ALBUMIN SERPL-MCNC: 4.4 G/DL (ref 3.5–5.2)
ALP SERPL-CCNC: 91 U/L (ref 35–104)
ALT SERPL-CCNC: 19 U/L (ref 0–32)
ANION GAP SERPL CALCULATED.3IONS-SCNC: 11 MMOL/L (ref 7–16)
AST SERPL-CCNC: 19 U/L (ref 0–31)
BASOPHILS # BLD: 0.01 E9/L (ref 0–0.2)
BASOPHILS NFR BLD: 0.2 % (ref 0–2)
BILIRUB SERPL-MCNC: 1.6 MG/DL (ref 0–1.2)
BUN SERPL-MCNC: 16 MG/DL (ref 6–23)
CALCIUM SERPL-MCNC: 9.7 MG/DL (ref 8.6–10.2)
CHLORIDE SERPL-SCNC: 106 MMOL/L (ref 98–107)
CO2 SERPL-SCNC: 24 MMOL/L (ref 22–29)
CREAT SERPL-MCNC: 1 MG/DL (ref 0.5–1)
EOSINOPHIL # BLD: 0.04 E9/L (ref 0.05–0.5)
EOSINOPHIL NFR BLD: 0.7 % (ref 0–6)
ERYTHROCYTE [DISTWIDTH] IN BLOOD BY AUTOMATED COUNT: 14 FL (ref 11.5–15)
GLUCOSE SERPL-MCNC: 116 MG/DL (ref 74–99)
HCT VFR BLD AUTO: 39.8 % (ref 34–48)
HGB BLD-MCNC: 13.2 G/DL (ref 11.5–15.5)
IMM GRANULOCYTES # BLD: 0.05 E9/L
IMM GRANULOCYTES NFR BLD: 0.9 % (ref 0–5)
LYMPHOCYTES # BLD: 1.5 E9/L (ref 1.5–4)
LYMPHOCYTES NFR BLD: 27.9 % (ref 20–42)
MCH RBC QN AUTO: 30.8 PG (ref 26–35)
MCHC RBC AUTO-ENTMCNC: 33.2 % (ref 32–34.5)
MCV RBC AUTO: 92.8 FL (ref 80–99.9)
MONOCYTES # BLD: 0.36 E9/L (ref 0.1–0.95)
MONOCYTES NFR BLD: 6.7 % (ref 2–12)
NEUTROPHILS # BLD: 3.42 E9/L (ref 1.8–7.3)
NEUTS SEG NFR BLD: 63.6 % (ref 43–80)
PLATELET # BLD AUTO: 267 E9/L (ref 130–450)
PMV BLD AUTO: 9.7 FL (ref 7–12)
POTASSIUM SERPL-SCNC: 4.3 MMOL/L (ref 3.5–5)
PROT SERPL-MCNC: 7.4 G/DL (ref 6.4–8.3)
RBC # BLD AUTO: 4.29 E12/L (ref 3.5–5.5)
SODIUM SERPL-SCNC: 141 MMOL/L (ref 132–146)
WBC # BLD: 5.4 E9/L (ref 4.5–11.5)

## 2023-05-22 PROCEDURE — 99214 OFFICE O/P EST MOD 30 MIN: CPT | Performed by: INTERNAL MEDICINE

## 2023-05-22 PROCEDURE — G8427 DOCREV CUR MEDS BY ELIG CLIN: HCPCS | Performed by: INTERNAL MEDICINE

## 2023-05-22 PROCEDURE — 3078F DIAST BP <80 MM HG: CPT | Performed by: INTERNAL MEDICINE

## 2023-05-22 PROCEDURE — 1036F TOBACCO NON-USER: CPT | Performed by: INTERNAL MEDICINE

## 2023-05-22 PROCEDURE — 99213 OFFICE O/P EST LOW 20 MIN: CPT

## 2023-05-22 PROCEDURE — 80053 COMPREHEN METABOLIC PANEL: CPT

## 2023-05-22 PROCEDURE — G8419 CALC BMI OUT NRM PARAM NOF/U: HCPCS | Performed by: INTERNAL MEDICINE

## 2023-05-22 PROCEDURE — 3074F SYST BP LT 130 MM HG: CPT | Performed by: INTERNAL MEDICINE

## 2023-05-22 PROCEDURE — 36415 COLL VENOUS BLD VENIPUNCTURE: CPT

## 2023-05-22 PROCEDURE — 3017F COLORECTAL CA SCREEN DOC REV: CPT | Performed by: INTERNAL MEDICINE

## 2023-05-22 PROCEDURE — 85025 COMPLETE CBC W/AUTO DIFF WBC: CPT

## 2023-05-22 NOTE — PROGRESS NOTES
[address] [facility] ONCOLOGY  [place] [address]  Dept: [facility]: [phone]  Progress note    Reason for Visit: NSCLC. Referring Physician: Dr. Sylvia Wise     PCP:  Dr. Ayse Pichardo    History of Present Illness:     Taylor Goyal is an 57 y.o.  female with a past medical history significant for insulin-dependent diabetes mellitus, and Graves' disease, who was referred by Dr. Sylvia Wise [facility] for non-small cell lung cancer: The patient was seen by Dr. Smita Sumner in August 2020 following a work-up for right inguinal adenopathy that returned benign on excisional biopsy. During that work-up we also obtained a CAT scan of the chest which showed sub-centimeter nodular opacities measuring less than 5 mm for which was referred to the lung cancer screening program.    She underwent CAT scan of the abdomen and pelvis in May 2022 for abdominal pain and while this did not show any acute process in the abdomen or pelvis there was report of indeterminate 1.7 cm subpleural lung nodule in the medial right middle lobe, mildly increased in size since prior CT dated 6/26/2020. She subsequently underwent PET/CT which showed FDG avid right middle lobe pulmonary nodule, concerning for possible hypermetabolic neoplasm. No hypermetabolic mass, adenopathy, or   fluid collection. MRI brain was negative for metastatic disease. On 7/20/2022 she underwent right middle lobe wedge resection and completion lobectomy with final pathology [id number], AJCC eighth stage IIb    Since her lung surgery she suffered 2 MIs in early and mid August 2022 and underwent a total of 4 coronary artery stent placements.  She is currently on aspirin and Brilinta and established cardiology care with Dr. Dewey Lauren at [facility].    Recall she also has a history of diabetes treated with subcu insulin and Graves' disease for which she follows Dr. Yoan Bowens, endocrinologist at

## 2023-06-12 ENCOUNTER — HOSPITAL ENCOUNTER (OUTPATIENT)
Dept: INFUSION THERAPY | Age: 63
Discharge: HOME | End: 2023-06-12
Payer: COMMERCIAL

## 2023-06-12 DIAGNOSIS — C34.91 ADENOCARCINOMA OF RIGHT LUNG (HCC): Primary | ICD-10-CM

## 2023-06-12 LAB
ALBUMIN SERPL-MCNC: 4.4 G/DL (ref 3.5–5.2)
ALP SERPL-CCNC: 71 U/L (ref 35–104)
ALT SERPL-CCNC: 16 U/L (ref 0–32)
ANION GAP SERPL CALCULATED.3IONS-SCNC: 9 MMOL/L (ref 7–16)
AST SERPL-CCNC: 20 U/L (ref 0–31)
BASOPHILS # BLD: 0.01 E9/L (ref 0–0.2)
BASOPHILS NFR BLD: 0.2 % (ref 0–2)
BILIRUB SERPL-MCNC: 1.9 MG/DL (ref 0–1.2)
BUN SERPL-MCNC: 12 MG/DL (ref 6–23)
CALCIUM SERPL-MCNC: 10 MG/DL (ref 8.6–10.2)
CHLORIDE SERPL-SCNC: 105 MMOL/L (ref 98–107)
CO2 SERPL-SCNC: 25 MMOL/L (ref 22–29)
CREAT SERPL-MCNC: 0.9 MG/DL (ref 0.5–1)
EOSINOPHIL # BLD: 0.05 E9/L (ref 0.05–0.5)
EOSINOPHIL NFR BLD: 1.1 % (ref 0–6)
ERYTHROCYTE [DISTWIDTH] IN BLOOD BY AUTOMATED COUNT: 14.7 FL (ref 11.5–15)
GLUCOSE SERPL-MCNC: 124 MG/DL (ref 74–99)
HCT VFR BLD AUTO: 38.2 % (ref 34–48)
HGB BLD-MCNC: 12.4 G/DL (ref 11.5–15.5)
IMM GRANULOCYTES # BLD: 0.02 E9/L
IMM GRANULOCYTES NFR BLD: 0.5 % (ref 0–5)
LYMPHOCYTES # BLD: 1.12 E9/L (ref 1.5–4)
LYMPHOCYTES NFR BLD: 25.2 % (ref 20–42)
MCH RBC QN AUTO: 30.5 PG (ref 26–35)
MCHC RBC AUTO-ENTMCNC: 32.5 % (ref 32–34.5)
MCV RBC AUTO: 93.9 FL (ref 80–99.9)
MONOCYTES # BLD: 0.37 E9/L (ref 0.1–0.95)
MONOCYTES NFR BLD: 8.3 % (ref 2–12)
NEUTROPHILS # BLD: 2.87 E9/L (ref 1.8–7.3)
NEUTS SEG NFR BLD: 64.7 % (ref 43–80)
PLATELET # BLD AUTO: 280 E9/L (ref 130–450)
PMV BLD AUTO: 9.6 FL (ref 7–12)
POTASSIUM SERPL-SCNC: 5 MMOL/L (ref 3.5–5)
PROT SERPL-MCNC: 7.7 G/DL (ref 6.4–8.3)
RBC # BLD AUTO: 4.07 E12/L (ref 3.5–5.5)
SODIUM SERPL-SCNC: 139 MMOL/L (ref 132–146)
WBC # BLD: 4.4 E9/L (ref 4.5–11.5)

## 2023-06-12 PROCEDURE — 80053 COMPREHEN METABOLIC PANEL: CPT

## 2023-06-12 PROCEDURE — 36415 COLL VENOUS BLD VENIPUNCTURE: CPT

## 2023-06-12 PROCEDURE — 85025 COMPLETE CBC W/AUTO DIFF WBC: CPT

## 2023-06-23 ENCOUNTER — HOSPITAL ENCOUNTER (OUTPATIENT)
Age: 63
Discharge: HOME | End: 2023-06-23
Attending: INTERNAL MEDICINE
Payer: COMMERCIAL

## 2023-06-23 ENCOUNTER — HOSPITAL ENCOUNTER (OUTPATIENT)
Dept: ULTRASOUND IMAGING | Age: 63
Discharge: HOME | End: 2023-06-23
Attending: INTERNAL MEDICINE
Payer: COMMERCIAL

## 2023-06-23 DIAGNOSIS — C34.91 ADENOCARCINOMA OF RIGHT LUNG (HCC): ICD-10-CM

## 2023-06-23 DIAGNOSIS — E80.6 HYPERBILIRUBINEMIA: ICD-10-CM

## 2023-06-23 LAB
ALBUMIN SERPL-MCNC: 4.4 G/DL (ref 3.5–5.2)
ALP SERPL-CCNC: 78 U/L (ref 35–104)
ALT SERPL-CCNC: 17 U/L (ref 0–32)
ANION GAP SERPL CALCULATED.3IONS-SCNC: 9 MMOL/L (ref 7–16)
AST SERPL-CCNC: 18 U/L (ref 0–31)
BILIRUB DIRECT SERPL-MCNC: 0.2 MG/DL (ref 0–0.3)
BILIRUB INDIRECT SERPL-MCNC: 0.8 MG/DL (ref 0–1)
BILIRUB SERPL-MCNC: 1 MG/DL (ref 0–1.2)
BUN SERPL-MCNC: 12 MG/DL (ref 6–23)
CALCIUM SERPL-MCNC: 9.5 MG/DL (ref 8.6–10.2)
CHLORIDE SERPL-SCNC: 106 MMOL/L (ref 98–107)
CO2 SERPL-SCNC: 27 MMOL/L (ref 22–29)
CREAT SERPL-MCNC: 0.8 MG/DL (ref 0.5–1)
GLUCOSE SERPL-MCNC: 177 MG/DL (ref 74–99)
POTASSIUM SERPL-SCNC: 4.6 MMOL/L (ref 3.5–5)
PROT SERPL-MCNC: 7.3 G/DL (ref 6.4–8.3)
SODIUM SERPL-SCNC: 142 MMOL/L (ref 132–146)

## 2023-06-23 PROCEDURE — 76705 ECHO EXAM OF ABDOMEN: CPT

## 2023-06-23 PROCEDURE — 36415 COLL VENOUS BLD VENIPUNCTURE: CPT

## 2023-06-23 PROCEDURE — 80048 BASIC METABOLIC PNL TOTAL CA: CPT

## 2023-06-23 PROCEDURE — 80076 HEPATIC FUNCTION PANEL: CPT

## 2023-08-28 ENCOUNTER — HOSPITAL ENCOUNTER (OUTPATIENT)
Dept: INFUSION THERAPY | Age: 63
Discharge: HOME | End: 2023-08-28
Payer: COMMERCIAL

## 2023-08-28 ENCOUNTER — OFFICE VISIT (OUTPATIENT)
Dept: ONCOLOGY | Age: 63
End: 2023-08-28
Payer: COMMERCIAL

## 2023-08-28 VITALS
SYSTOLIC BLOOD PRESSURE: 162 MMHG | DIASTOLIC BLOOD PRESSURE: 80 MMHG | OXYGEN SATURATION: 98 % | TEMPERATURE: 98.5 F | HEIGHT: 62 IN | WEIGHT: 141.6 LBS | HEART RATE: 70 BPM | BODY MASS INDEX: 26.06 KG/M2

## 2023-08-28 DIAGNOSIS — C34.91 ADENOCARCINOMA OF RIGHT LUNG (HCC): Primary | ICD-10-CM

## 2023-08-28 DIAGNOSIS — F41.9 ANXIETY: ICD-10-CM

## 2023-08-28 LAB
ALBUMIN SERPL-MCNC: 4.2 G/DL (ref 3.5–5.2)
ALP SERPL-CCNC: 82 U/L (ref 35–104)
ALT SERPL-CCNC: 19 U/L (ref 0–32)
ANION GAP SERPL CALCULATED.3IONS-SCNC: 12 MMOL/L (ref 7–16)
AST SERPL-CCNC: 25 U/L (ref 0–31)
BASOPHILS # BLD: 0.02 K/UL (ref 0–0.2)
BASOPHILS NFR BLD: 0 % (ref 0–2)
BILIRUB SERPL-MCNC: 2.2 MG/DL (ref 0–1.2)
BUN SERPL-MCNC: 19 MG/DL (ref 6–23)
CALCIUM SERPL-MCNC: 9.2 MG/DL (ref 8.6–10.2)
CHLORIDE SERPL-SCNC: 105 MMOL/L (ref 98–107)
CO2 SERPL-SCNC: 22 MMOL/L (ref 22–29)
CREAT SERPL-MCNC: 0.9 MG/DL (ref 0.5–1)
EOSINOPHIL # BLD: 0.04 K/UL (ref 0.05–0.5)
EOSINOPHILS RELATIVE PERCENT: 1 % (ref 0–6)
ERYTHROCYTE [DISTWIDTH] IN BLOOD BY AUTOMATED COUNT: 12.9 % (ref 11.5–15)
GFR SERPL CREATININE-BSD FRML MDRD: >60 ML/MIN/1.73M2
GLUCOSE SERPL-MCNC: 229 MG/DL (ref 74–99)
HCT VFR BLD AUTO: 36.3 % (ref 34–48)
HGB BLD-MCNC: 11.9 G/DL (ref 11.5–15.5)
IMM GRANULOCYTES # BLD AUTO: 0.09 K/UL (ref 0–0.58)
IMM GRANULOCYTES NFR BLD: 2 % (ref 0–5)
LYMPHOCYTES NFR BLD: 1.2 K/UL (ref 1.5–4)
LYMPHOCYTES RELATIVE PERCENT: 20 % (ref 20–42)
MCH RBC QN AUTO: 31.6 PG (ref 26–35)
MCHC RBC AUTO-ENTMCNC: 32.8 G/DL (ref 32–34.5)
MCV RBC AUTO: 96.5 FL (ref 80–99.9)
MONOCYTES NFR BLD: 0.54 K/UL (ref 0.1–0.95)
MONOCYTES NFR BLD: 9 % (ref 2–12)
NEUTROPHILS NFR BLD: 69 % (ref 43–80)
NEUTS SEG NFR BLD: 4.27 K/UL (ref 1.8–7.3)
PLATELET # BLD AUTO: 241 K/UL (ref 130–450)
PMV BLD AUTO: 10.7 FL (ref 7–12)
POTASSIUM SERPL-SCNC: 3.7 MMOL/L (ref 3.5–5)
PROT SERPL-MCNC: 7.3 G/DL (ref 6.4–8.3)
RBC # BLD AUTO: 3.76 M/UL (ref 3.5–5.5)
SODIUM SERPL-SCNC: 139 MMOL/L (ref 132–146)
WBC OTHER # BLD: 6.2 K/UL (ref 4.5–11.5)

## 2023-08-28 PROCEDURE — 80053 COMPREHEN METABOLIC PANEL: CPT

## 2023-08-28 PROCEDURE — 3078F DIAST BP <80 MM HG: CPT | Performed by: INTERNAL MEDICINE

## 2023-08-28 PROCEDURE — G8427 DOCREV CUR MEDS BY ELIG CLIN: HCPCS | Performed by: INTERNAL MEDICINE

## 2023-08-28 PROCEDURE — 99213 OFFICE O/P EST LOW 20 MIN: CPT | Performed by: INTERNAL MEDICINE

## 2023-08-28 PROCEDURE — 1036F TOBACCO NON-USER: CPT | Performed by: INTERNAL MEDICINE

## 2023-08-28 PROCEDURE — 85025 COMPLETE CBC W/AUTO DIFF WBC: CPT

## 2023-08-28 PROCEDURE — 3074F SYST BP LT 130 MM HG: CPT | Performed by: INTERNAL MEDICINE

## 2023-08-28 PROCEDURE — 36415 COLL VENOUS BLD VENIPUNCTURE: CPT

## 2023-08-28 PROCEDURE — 3017F COLORECTAL CA SCREEN DOC REV: CPT | Performed by: INTERNAL MEDICINE

## 2023-08-28 PROCEDURE — G8419 CALC BMI OUT NRM PARAM NOF/U: HCPCS | Performed by: INTERNAL MEDICINE

## 2023-08-28 RX ORDER — CLONAZEPAM 0.5 MG/1
0.5 TABLET ORAL 3 TIMES DAILY PRN
Qty: 42 TABLET | Refills: 0 | Status: SHIPPED | OUTPATIENT
Start: 2023-08-28 | End: 2023-09-11

## 2023-08-28 NOTE — PROGRESS NOTES
discussed the case with him, due to the lower level of FDG avidity, and high risk of stopping Brilinta prior to August, the decision was made to re-eval in [address] [place] [address].      The patient had a bronchoscopy/EBUS done on Friday 8/25/2023 by Dr. Isela Ramirez. Today 8/28/2023, pathology results are pending, frozen section was positive for malignant cells. We tried to reschedule her follow-up to Thursday when pathology results are available, the patient was very upset and crying, she would like to be referred to the [facility], referral was placed. I ordered a PET scan and a brain MRI for staging. The patient was referred to palliative medicine for symptoms management, lorazepam prescription was sent to her pharmacy. I discussed the case with Dr. Isela Ramirez.     Tresa Wyman MD   HEMATOLOGY/MEDICAL [place] [address]  Dept: [address]: [phone]

## 2023-08-29 ENCOUNTER — TELEPHONE (OUTPATIENT)
Dept: PALLATIVE CARE | Age: 63
End: 2023-08-29

## 2023-08-29 NOTE — TELEPHONE ENCOUNTER
Call to [facility] regarding referral for Palliative Care. Explained Palliative Care and location of clinic. Venkat set 9/27/23.

## 2023-09-25 ENCOUNTER — APPOINTMENT (OUTPATIENT)
Dept: MRI IMAGING | Age: 63
End: 2023-09-25
Attending: INTERNAL MEDICINE
Payer: MEDICARE

## 2023-09-25 ENCOUNTER — HOSPITAL ENCOUNTER (OUTPATIENT)
Dept: NUCLEAR MEDICINE | Age: 63
Discharge: HOME | End: 2023-09-25
Attending: INTERNAL MEDICINE
Payer: MEDICARE

## 2023-09-25 ENCOUNTER — HOSPITAL ENCOUNTER (OUTPATIENT)
Dept: MRI IMAGING | Age: 63
Discharge: HOME | End: 2023-09-27
Payer: MEDICARE

## 2023-09-25 DIAGNOSIS — C34.91 ADENOCARCINOMA OF RIGHT LUNG (HCC): ICD-10-CM

## 2023-09-25 DIAGNOSIS — C34.81 MALIGNANT NEOPLASM OF OVERLAPPING SITES OF RIGHT BRONCHUS AND LUNG (HCC): ICD-10-CM

## 2023-09-25 PROCEDURE — 6360000004 HC RX CONTRAST MEDICATION: Performed by: RADIOLOGY

## 2023-09-25 PROCEDURE — A9577 INJ MULTIHANCE: HCPCS | Performed by: RADIOLOGY

## 2023-09-25 PROCEDURE — 70553 MRI BRAIN STEM W/O & W/DYE: CPT

## 2023-09-25 RX ORDER — FLUDEOXYGLUCOSE F 18 200 MCI/ML
10 INJECTION, SOLUTION INTRAVENOUS
Status: DISCONTINUED | OUTPATIENT
Start: 2023-09-25 | End: 2023-09-26 | Stop reason: HOSPADM

## 2023-09-25 RX ADMIN — GADOBENATE DIMEGLUMINE 13 ML: 529 INJECTION, SOLUTION INTRAVENOUS at 09:47

## 2023-10-09 ENCOUNTER — HOSPITAL ENCOUNTER (OUTPATIENT)
Dept: NUCLEAR MEDICINE | Age: 63
Discharge: HOME | End: 2023-10-09
Payer: MEDICARE

## 2023-10-09 DIAGNOSIS — C34.81 MALIGNANT NEOPLASM OF OVERLAPPING SITES OF RIGHT LUNG (HCC): ICD-10-CM

## 2023-10-09 PROCEDURE — A9552 F18 FDG: HCPCS | Performed by: RADIOLOGY

## 2023-10-09 PROCEDURE — 3430000000 HC RX DIAGNOSTIC RADIOPHARMACEUTICAL: Performed by: RADIOLOGY

## 2023-10-09 PROCEDURE — 78815 PET IMAGE W/CT SKULL-THIGH: CPT

## 2023-10-09 RX ORDER — FLUDEOXYGLUCOSE F 18 200 MCI/ML
15 INJECTION, SOLUTION INTRAVENOUS
Status: COMPLETED | OUTPATIENT
Start: 2023-10-09 | End: 2023-10-09

## 2023-10-09 RX ADMIN — FLUDEOXYGLUCOSE F 18 15 MILLICURIE: 200 INJECTION, SOLUTION INTRAVENOUS at 13:04

## 2023-10-20 NOTE — ED NOTES
Bed: 02  Expected date:   Expected time:   Means of arrival:   Comments:  William Mario RN  05/23/19 3358
Blood to lab, poct glucose done, pt to ct via cart with rn at  [facility], RN  05/23/19 002 [address] [place] [address], RN  05/23/19 5548
Report given oncoming rn/na     Subhash Schuster RN  05/23/19 1147
Quality 431: Preventive Care And Screening: Unhealthy Alcohol Use - Screening: Patient not identified as an unhealthy alcohol user when screened for unhealthy alcohol use using a systematic screening method
Detail Level: Detailed
Quality 110: Preventive Care And Screening: Influenza Immunization: Influenza immunization was not ordered or administered, reason not given
Quality 130: Documentation Of Current Medications In The Medical Record: Current Medications Documented
Quality 226: Preventive Care And Screening: Tobacco Use: Screening And Cessation Intervention: Patient screened for tobacco use and is an ex/non-smoker

## 2023-10-25 DIAGNOSIS — I25.10 ATHEROSCLEROTIC HEART DISEASE OF NATIVE CORONARY ARTERY WITHOUT ANGINA PECTORIS: ICD-10-CM

## 2023-10-31 RX ORDER — METOPROLOL SUCCINATE 25 MG/1
25 TABLET, EXTENDED RELEASE ORAL DAILY
Qty: 90 TABLET | Refills: 3 | OUTPATIENT
Start: 2023-10-31

## 2023-10-31 NOTE — TELEPHONE ENCOUNTER
Called because we got a refill request and she hasn't followed up here.  She has another doctor.  She will call them for a refill.

## 2024-02-27 ENCOUNTER — HOSPITAL ENCOUNTER (OUTPATIENT)
Dept: CT IMAGING | Age: 64
Discharge: HOME | End: 2024-02-27
Payer: MEDICARE

## 2024-02-27 DIAGNOSIS — R19.7 DIARRHEA, UNSPECIFIED TYPE: ICD-10-CM

## 2024-02-27 DIAGNOSIS — F41.9 ANXIETY DISORDER, UNSPECIFIED TYPE: ICD-10-CM

## 2024-02-27 DIAGNOSIS — R10.9 ACUTE ABDOMINAL PAIN: ICD-10-CM

## 2024-02-27 DIAGNOSIS — C34.81 MALIGNANT NEOPLASM OF OVERLAPPING SITES OF RIGHT LUNG (HCC): ICD-10-CM

## 2024-02-27 PROCEDURE — 6360000004 HC RX CONTRAST MEDICATION: Performed by: RADIOLOGY

## 2024-02-27 PROCEDURE — 71260 CT THORAX DX C+: CPT

## 2024-02-27 RX ADMIN — IOPAMIDOL 75 ML: 755 INJECTION, SOLUTION INTRAVENOUS at 09:53

## 2024-08-01 ENCOUNTER — APPOINTMENT (OUTPATIENT)
Dept: CT IMAGING | Age: 64
End: 2024-08-01
Payer: MEDICARE

## 2024-08-01 ENCOUNTER — HOSPITAL ENCOUNTER (INPATIENT)
Age: 64
LOS: 1 days | Discharge: HOME | End: 2024-08-02
Attending: EMERGENCY MEDICINE | Admitting: FAMILY MEDICINE
Payer: MEDICARE

## 2024-08-01 DIAGNOSIS — E83.42 HYPOMAGNESEMIA: ICD-10-CM

## 2024-08-01 DIAGNOSIS — S22.000A COMPRESSION FRACTURE OF BODY OF THORACIC VERTEBRA (HCC): ICD-10-CM

## 2024-08-01 DIAGNOSIS — E87.6 HYPOKALEMIA: Primary | ICD-10-CM

## 2024-08-01 DIAGNOSIS — R11.2 NAUSEA AND VOMITING, UNSPECIFIED VOMITING TYPE: ICD-10-CM

## 2024-08-01 DIAGNOSIS — E83.51 HYPOCALCEMIA: ICD-10-CM

## 2024-08-01 LAB
ALBUMIN SERPL-MCNC: 2.3 G/DL (ref 3.5–5.2)
ALP SERPL-CCNC: 64 U/L (ref 35–104)
ALT SERPL-CCNC: 7 U/L (ref 0–32)
ANION GAP SERPL CALCULATED.3IONS-SCNC: 12 MMOL/L (ref 7–16)
AST SERPL-CCNC: 14 U/L (ref 0–31)
BASOPHILS # BLD: 0.04 K/UL (ref 0–0.2)
BASOPHILS NFR BLD: 1 % (ref 0–2)
BILIRUB DIRECT SERPL-MCNC: <0.2 MG/DL (ref 0–0.3)
BILIRUB INDIRECT SERPL-MCNC: ABNORMAL MG/DL (ref 0–1)
BILIRUB SERPL-MCNC: 0.5 MG/DL (ref 0–1.2)
BILIRUB UR QL STRIP: NEGATIVE
BUN SERPL-MCNC: 3 MG/DL (ref 6–23)
CA-I BLD-SCNC: 1.2 MMOL/L (ref 1.15–1.33)
CALCIUM SERPL-MCNC: 4.8 MG/DL (ref 8.6–10.2)
CHLORIDE SERPL-SCNC: 118 MMOL/L (ref 98–107)
CLARITY UR: CLEAR
CO2 SERPL-SCNC: 13 MMOL/L (ref 22–29)
COLOR UR: YELLOW
CREAT SERPL-MCNC: 0.3 MG/DL (ref 0.5–1)
EOSINOPHIL # BLD: 0.15 K/UL (ref 0.05–0.5)
EOSINOPHILS RELATIVE PERCENT: 4 % (ref 0–6)
ERYTHROCYTE [DISTWIDTH] IN BLOOD BY AUTOMATED COUNT: 14.9 % (ref 11.5–15)
GFR, ESTIMATED: >90 ML/MIN/1.73M2
GLUCOSE SERPL-MCNC: 140 MG/DL (ref 74–99)
GLUCOSE UR STRIP-MCNC: >=1000 MG/DL
HCT VFR BLD AUTO: 35.5 % (ref 34–48)
HGB BLD-MCNC: 11.8 G/DL (ref 11.5–15.5)
HGB UR QL STRIP.AUTO: NEGATIVE
KETONES UR STRIP-MCNC: NEGATIVE MG/DL
LACTATE BLDV-SCNC: 1.7 MMOL/L (ref 0.5–2.2)
LEUKOCYTE ESTERASE UR QL STRIP: NEGATIVE
LIPASE SERPL-CCNC: 24 U/L (ref 13–60)
LYMPHOCYTES NFR BLD: 0.6 K/UL (ref 1.5–4)
LYMPHOCYTES RELATIVE PERCENT: 14 % (ref 20–42)
MAGNESIUM SERPL-MCNC: 1 MG/DL (ref 1.6–2.6)
MCH RBC QN AUTO: 30.5 PG (ref 26–35)
MCHC RBC AUTO-ENTMCNC: 33.2 G/DL (ref 32–34.5)
MCV RBC AUTO: 91.7 FL (ref 80–99.9)
MONOCYTES NFR BLD: 0.3 K/UL (ref 0.1–0.95)
MONOCYTES NFR BLD: 7 % (ref 2–12)
NEUTROPHILS NFR BLD: 75 % (ref 43–80)
NEUTS SEG NFR BLD: 3.22 K/UL (ref 1.8–7.3)
NITRITE UR QL STRIP: NEGATIVE
PH UR STRIP: 6.5 [PH] (ref 5–9)
PLATELET # BLD AUTO: 292 K/UL (ref 130–450)
PMV BLD AUTO: 9.8 FL (ref 7–12)
POTASSIUM SERPL-SCNC: 2.3 MMOL/L (ref 3.5–5)
PROT SERPL-MCNC: 3.8 G/DL (ref 6.4–8.3)
PROT UR STRIP-MCNC: NEGATIVE MG/DL
RBC # BLD AUTO: 3.87 M/UL (ref 3.5–5.5)
RBC # BLD: ABNORMAL 10*6/UL
RBC #/AREA URNS HPF: ABNORMAL /HPF
SODIUM SERPL-SCNC: 143 MMOL/L (ref 132–146)
SP GR UR STRIP: 1.01 (ref 1–1.03)
TROPONIN I SERPL HS-MCNC: 6 NG/L (ref 0–9)
UROBILINOGEN UR STRIP-ACNC: 0.2 EU/DL (ref 0–1)
WBC #/AREA URNS HPF: ABNORMAL /HPF
WBC OTHER # BLD: 4.3 K/UL (ref 4.5–11.5)

## 2024-08-01 PROCEDURE — 85025 COMPLETE CBC W/AUTO DIFF WBC: CPT

## 2024-08-01 PROCEDURE — 96376 TX/PRO/DX INJ SAME DRUG ADON: CPT

## 2024-08-01 PROCEDURE — 71275 CT ANGIOGRAPHY CHEST: CPT

## 2024-08-01 PROCEDURE — 6360000002 HC RX W HCPCS: Performed by: EMERGENCY MEDICINE

## 2024-08-01 PROCEDURE — 82330 ASSAY OF CALCIUM: CPT

## 2024-08-01 PROCEDURE — 83735 ASSAY OF MAGNESIUM: CPT

## 2024-08-01 PROCEDURE — 6360000004 HC RX CONTRAST MEDICATION: Performed by: RADIOLOGY

## 2024-08-01 PROCEDURE — 1200000000 HC SEMI PRIVATE

## 2024-08-01 PROCEDURE — 72125 CT NECK SPINE W/O DYE: CPT

## 2024-08-01 PROCEDURE — 87040 BLOOD CULTURE FOR BACTERIA: CPT

## 2024-08-01 PROCEDURE — 99223 1ST HOSP IP/OBS HIGH 75: CPT | Performed by: FAMILY MEDICINE

## 2024-08-01 PROCEDURE — 36415 COLL VENOUS BLD VENIPUNCTURE: CPT

## 2024-08-01 PROCEDURE — 80048 BASIC METABOLIC PNL TOTAL CA: CPT

## 2024-08-01 PROCEDURE — 82248 BILIRUBIN DIRECT: CPT

## 2024-08-01 PROCEDURE — 80053 COMPREHEN METABOLIC PANEL: CPT

## 2024-08-01 PROCEDURE — 96365 THER/PROPH/DIAG IV INF INIT: CPT

## 2024-08-01 PROCEDURE — 99285 EMERGENCY DEPT VISIT HI MDM: CPT

## 2024-08-01 PROCEDURE — 70450 CT HEAD/BRAIN W/O DYE: CPT

## 2024-08-01 PROCEDURE — 96367 TX/PROPH/DG ADDL SEQ IV INF: CPT

## 2024-08-01 PROCEDURE — 93005 ELECTROCARDIOGRAM TRACING: CPT | Performed by: EMERGENCY MEDICINE

## 2024-08-01 PROCEDURE — 84484 ASSAY OF TROPONIN QUANT: CPT

## 2024-08-01 PROCEDURE — 74177 CT ABD & PELVIS W/CONTRAST: CPT

## 2024-08-01 PROCEDURE — 83605 ASSAY OF LACTIC ACID: CPT

## 2024-08-01 PROCEDURE — 72131 CT LUMBAR SPINE W/O DYE: CPT

## 2024-08-01 PROCEDURE — 81001 URINALYSIS AUTO W/SCOPE: CPT

## 2024-08-01 PROCEDURE — 6370000000 HC RX 637 (ALT 250 FOR IP): Performed by: EMERGENCY MEDICINE

## 2024-08-01 PROCEDURE — 2580000003 HC RX 258: Performed by: EMERGENCY MEDICINE

## 2024-08-01 PROCEDURE — 2500000003 HC RX 250 WO HCPCS: Performed by: EMERGENCY MEDICINE

## 2024-08-01 PROCEDURE — 72128 CT CHEST SPINE W/O DYE: CPT

## 2024-08-01 PROCEDURE — 96375 TX/PRO/DX INJ NEW DRUG ADDON: CPT

## 2024-08-01 PROCEDURE — 83690 ASSAY OF LIPASE: CPT

## 2024-08-01 RX ORDER — POTASSIUM CHLORIDE 7.45 MG/ML
10 INJECTION INTRAVENOUS PRN
Status: DISCONTINUED | OUTPATIENT
Start: 2024-08-01 | End: 2024-08-02 | Stop reason: HOSPADM

## 2024-08-01 RX ORDER — NALOXONE HYDROCHLORIDE 0.4 MG/ML
INJECTION, SOLUTION INTRAMUSCULAR; INTRAVENOUS; SUBCUTANEOUS PRN
Status: DISCONTINUED | OUTPATIENT
Start: 2024-08-01 | End: 2024-08-02

## 2024-08-01 RX ORDER — POTASSIUM CHLORIDE 7.45 MG/ML
10 INJECTION INTRAVENOUS
Status: DISCONTINUED | OUTPATIENT
Start: 2024-08-01 | End: 2024-08-01

## 2024-08-01 RX ORDER — POTASSIUM CHLORIDE 20 MEQ/1
40 TABLET, EXTENDED RELEASE ORAL PRN
Status: DISCONTINUED | OUTPATIENT
Start: 2024-08-01 | End: 2024-08-02 | Stop reason: HOSPADM

## 2024-08-01 RX ORDER — 0.9 % SODIUM CHLORIDE 0.9 %
1000 INTRAVENOUS SOLUTION INTRAVENOUS ONCE
Status: COMPLETED | OUTPATIENT
Start: 2024-08-01 | End: 2024-08-01

## 2024-08-01 RX ORDER — MAGNESIUM SULFATE IN WATER 40 MG/ML
2000 INJECTION, SOLUTION INTRAVENOUS ONCE
Status: COMPLETED | OUTPATIENT
Start: 2024-08-01 | End: 2024-08-01

## 2024-08-01 RX ORDER — INSULIN LISPRO 100 [IU]/ML
0-4 INJECTION, SOLUTION INTRAVENOUS; SUBCUTANEOUS
Status: DISCONTINUED | OUTPATIENT
Start: 2024-08-02 | End: 2024-08-02 | Stop reason: HOSPADM

## 2024-08-01 RX ORDER — DEXTROSE MONOHYDRATE 100 MG/ML
INJECTION, SOLUTION INTRAVENOUS CONTINUOUS PRN
Status: DISCONTINUED | OUTPATIENT
Start: 2024-08-01 | End: 2024-08-02 | Stop reason: HOSPADM

## 2024-08-01 RX ORDER — SODIUM CHLORIDE 0.9 % (FLUSH) 0.9 %
5-40 SYRINGE (ML) INJECTION PRN
Status: DISCONTINUED | OUTPATIENT
Start: 2024-08-01 | End: 2024-08-02 | Stop reason: HOSPADM

## 2024-08-01 RX ORDER — FENTANYL CITRATE 50 UG/ML
50 INJECTION, SOLUTION INTRAMUSCULAR; INTRAVENOUS
Status: DISCONTINUED | OUTPATIENT
Start: 2024-08-01 | End: 2024-08-01

## 2024-08-01 RX ORDER — ACETAMINOPHEN 325 MG/1
650 TABLET ORAL EVERY 6 HOURS PRN
Status: DISCONTINUED | OUTPATIENT
Start: 2024-08-01 | End: 2024-08-02 | Stop reason: HOSPADM

## 2024-08-01 RX ORDER — ENOXAPARIN SODIUM 100 MG/ML
40 INJECTION SUBCUTANEOUS DAILY
Status: DISCONTINUED | OUTPATIENT
Start: 2024-08-01 | End: 2024-08-02 | Stop reason: HOSPADM

## 2024-08-01 RX ORDER — ONDANSETRON 4 MG/1
4 TABLET, ORALLY DISINTEGRATING ORAL EVERY 8 HOURS PRN
Status: DISCONTINUED | OUTPATIENT
Start: 2024-08-01 | End: 2024-08-02 | Stop reason: HOSPADM

## 2024-08-01 RX ORDER — ACETAMINOPHEN 650 MG/1
650 SUPPOSITORY RECTAL EVERY 6 HOURS PRN
Status: DISCONTINUED | OUTPATIENT
Start: 2024-08-01 | End: 2024-08-02 | Stop reason: HOSPADM

## 2024-08-01 RX ORDER — INSULIN GLARGINE 100 [IU]/ML
10 INJECTION, SOLUTION SUBCUTANEOUS EVERY MORNING
Status: DISCONTINUED | OUTPATIENT
Start: 2024-08-02 | End: 2024-08-02 | Stop reason: HOSPADM

## 2024-08-01 RX ORDER — ROSUVASTATIN CALCIUM 10 MG/1
10 TABLET, COATED ORAL NIGHTLY
Status: DISCONTINUED | OUTPATIENT
Start: 2024-08-01 | End: 2024-08-02 | Stop reason: HOSPADM

## 2024-08-01 RX ORDER — CALCIUM GLUCONATE 20 MG/ML
1000 INJECTION, SOLUTION INTRAVENOUS ONCE
Status: COMPLETED | OUTPATIENT
Start: 2024-08-01 | End: 2024-08-01

## 2024-08-01 RX ORDER — POLYETHYLENE GLYCOL 3350 17 G/17G
17 POWDER, FOR SOLUTION ORAL DAILY PRN
Status: DISCONTINUED | OUTPATIENT
Start: 2024-08-01 | End: 2024-08-02

## 2024-08-01 RX ORDER — GLUCAGON 1 MG/ML
1 KIT INJECTION PRN
Status: DISCONTINUED | OUTPATIENT
Start: 2024-08-01 | End: 2024-08-02 | Stop reason: HOSPADM

## 2024-08-01 RX ORDER — PANTOPRAZOLE SODIUM 40 MG/1
40 TABLET, DELAYED RELEASE ORAL
Status: DISCONTINUED | OUTPATIENT
Start: 2024-08-02 | End: 2024-08-02 | Stop reason: HOSPADM

## 2024-08-01 RX ORDER — SODIUM CHLORIDE 0.9 % (FLUSH) 0.9 %
5-40 SYRINGE (ML) INJECTION EVERY 12 HOURS SCHEDULED
Status: DISCONTINUED | OUTPATIENT
Start: 2024-08-01 | End: 2024-08-02 | Stop reason: HOSPADM

## 2024-08-01 RX ORDER — METHIMAZOLE 5 MG/1
2.5 TABLET ORAL DAILY
Status: DISCONTINUED | OUTPATIENT
Start: 2024-08-02 | End: 2024-08-02 | Stop reason: HOSPADM

## 2024-08-01 RX ORDER — FENTANYL CITRATE 50 UG/ML
25 INJECTION, SOLUTION INTRAMUSCULAR; INTRAVENOUS
Status: DISCONTINUED | OUTPATIENT
Start: 2024-08-01 | End: 2024-08-01

## 2024-08-01 RX ORDER — LOSARTAN POTASSIUM 50 MG/1
50 TABLET ORAL DAILY
Status: DISCONTINUED | OUTPATIENT
Start: 2024-08-02 | End: 2024-08-02 | Stop reason: HOSPADM

## 2024-08-01 RX ORDER — DROPERIDOL 2.5 MG/ML
1.25 INJECTION, SOLUTION INTRAMUSCULAR; INTRAVENOUS ONCE
Status: COMPLETED | OUTPATIENT
Start: 2024-08-01 | End: 2024-08-01

## 2024-08-01 RX ORDER — FENTANYL CITRATE 50 UG/ML
50 INJECTION, SOLUTION INTRAMUSCULAR; INTRAVENOUS ONCE
Status: COMPLETED | OUTPATIENT
Start: 2024-08-01 | End: 2024-08-01

## 2024-08-01 RX ORDER — ONDANSETRON 2 MG/ML
4 INJECTION INTRAMUSCULAR; INTRAVENOUS EVERY 6 HOURS PRN
Status: DISCONTINUED | OUTPATIENT
Start: 2024-08-01 | End: 2024-08-02 | Stop reason: HOSPADM

## 2024-08-01 RX ORDER — INSULIN LISPRO 100 [IU]/ML
0-4 INJECTION, SOLUTION INTRAVENOUS; SUBCUTANEOUS NIGHTLY
Status: DISCONTINUED | OUTPATIENT
Start: 2024-08-01 | End: 2024-08-02 | Stop reason: HOSPADM

## 2024-08-01 RX ORDER — PROCHLORPERAZINE EDISYLATE 5 MG/ML
10 INJECTION INTRAMUSCULAR; INTRAVENOUS EVERY 6 HOURS PRN
Status: DISCONTINUED | OUTPATIENT
Start: 2024-08-01 | End: 2024-08-02 | Stop reason: HOSPADM

## 2024-08-01 RX ORDER — MAGNESIUM SULFATE IN WATER 40 MG/ML
2000 INJECTION, SOLUTION INTRAVENOUS PRN
Status: DISCONTINUED | OUTPATIENT
Start: 2024-08-01 | End: 2024-08-02 | Stop reason: HOSPADM

## 2024-08-01 RX ORDER — SODIUM CHLORIDE, SODIUM LACTATE, POTASSIUM CHLORIDE, CALCIUM CHLORIDE 600; 310; 30; 20 MG/100ML; MG/100ML; MG/100ML; MG/100ML
INJECTION, SOLUTION INTRAVENOUS CONTINUOUS
Status: DISCONTINUED | OUTPATIENT
Start: 2024-08-01 | End: 2024-08-02 | Stop reason: HOSPADM

## 2024-08-01 RX ORDER — SODIUM CHLORIDE 9 MG/ML
INJECTION, SOLUTION INTRAVENOUS PRN
Status: DISCONTINUED | OUTPATIENT
Start: 2024-08-01 | End: 2024-08-02 | Stop reason: HOSPADM

## 2024-08-01 RX ORDER — POTASSIUM CHLORIDE 7.45 MG/ML
10 INJECTION INTRAVENOUS
Status: ACTIVE | OUTPATIENT
Start: 2024-08-01 | End: 2024-08-01

## 2024-08-01 RX ADMIN — POTASSIUM CHLORIDE 10 MEQ: 7.46 INJECTION, SOLUTION INTRAVENOUS at 22:43

## 2024-08-01 RX ADMIN — CALCIUM GLUCONATE 1000 MG: 20 INJECTION, SOLUTION INTRAVENOUS at 18:20

## 2024-08-01 RX ADMIN — DROPERIDOL 1.25 MG: 2.5 INJECTION, SOLUTION INTRAMUSCULAR; INTRAVENOUS at 17:36

## 2024-08-01 RX ADMIN — POTASSIUM CHLORIDE 10 MEQ: 7.46 INJECTION, SOLUTION INTRAVENOUS at 21:09

## 2024-08-01 RX ADMIN — MAGNESIUM SULFATE HEPTAHYDRATE 2000 MG: 40 INJECTION, SOLUTION INTRAVENOUS at 19:33

## 2024-08-01 RX ADMIN — POTASSIUM BICARBONATE 50 MEQ: 978 TABLET, EFFERVESCENT ORAL at 18:30

## 2024-08-01 RX ADMIN — SODIUM CHLORIDE 1000 ML: 9 INJECTION, SOLUTION INTRAVENOUS at 18:19

## 2024-08-01 RX ADMIN — FENTANYL CITRATE 50 MCG: 50 INJECTION, SOLUTION INTRAMUSCULAR; INTRAVENOUS at 20:15

## 2024-08-01 RX ADMIN — POTASSIUM CHLORIDE 10 MEQ: 7.46 INJECTION, SOLUTION INTRAVENOUS at 18:20

## 2024-08-01 RX ADMIN — POTASSIUM CHLORIDE 10 MEQ: 7.46 INJECTION, SOLUTION INTRAVENOUS at 19:29

## 2024-08-01 RX ADMIN — IOPAMIDOL 75 ML: 755 INJECTION, SOLUTION INTRAVENOUS at 18:32

## 2024-08-01 ASSESSMENT — LIFESTYLE VARIABLES
HOW OFTEN DO YOU HAVE A DRINK CONTAINING ALCOHOL: NEVER
HOW MANY STANDARD DRINKS CONTAINING ALCOHOL DO YOU HAVE ON A TYPICAL DAY: PATIENT DOES NOT DRINK

## 2024-08-01 ASSESSMENT — PAIN DESCRIPTION - ORIENTATION: ORIENTATION: MID

## 2024-08-01 ASSESSMENT — PAIN SCALES - GENERAL
PAINLEVEL_OUTOF10: 4
PAINLEVEL_OUTOF10: 8
PAINLEVEL_OUTOF10: 7

## 2024-08-01 ASSESSMENT — PAIN - FUNCTIONAL ASSESSMENT: PAIN_FUNCTIONAL_ASSESSMENT: 0-10

## 2024-08-01 ASSESSMENT — PAIN DESCRIPTION - LOCATION: LOCATION: BACK

## 2024-08-02 VITALS
OXYGEN SATURATION: 98 % | BODY MASS INDEX: 22.38 KG/M2 | TEMPERATURE: 98.2 F | DIASTOLIC BLOOD PRESSURE: 76 MMHG | HEIGHT: 62 IN | RESPIRATION RATE: 18 BRPM | SYSTOLIC BLOOD PRESSURE: 129 MMHG | HEART RATE: 72 BPM | WEIGHT: 121.6 LBS

## 2024-08-02 PROBLEM — Z51.5 PALLIATIVE CARE ENCOUNTER: Status: ACTIVE | Noted: 2024-08-02

## 2024-08-02 LAB
ANION GAP SERPL CALCULATED.3IONS-SCNC: 12 MMOL/L (ref 7–16)
ANION GAP SERPL CALCULATED.3IONS-SCNC: 8 MMOL/L (ref 7–16)
BASOPHILS # BLD: 0 K/UL (ref 0–0.2)
BASOPHILS NFR BLD: 0 % (ref 0–2)
BUN SERPL-MCNC: 4 MG/DL (ref 6–23)
BUN SERPL-MCNC: 4 MG/DL (ref 6–23)
CALCIUM SERPL-MCNC: 8.1 MG/DL (ref 8.6–10.2)
CALCIUM SERPL-MCNC: 8.7 MG/DL (ref 8.6–10.2)
CHLORIDE SERPL-SCNC: 103 MMOL/L (ref 98–107)
CHLORIDE SERPL-SCNC: 109 MMOL/L (ref 98–107)
CO2 SERPL-SCNC: 16 MMOL/L (ref 22–29)
CO2 SERPL-SCNC: 25 MMOL/L (ref 22–29)
CREAT SERPL-MCNC: 0.5 MG/DL (ref 0.5–1)
CREAT SERPL-MCNC: 0.5 MG/DL (ref 0.5–1)
EKG ATRIAL RATE: 95 BPM
EKG P AXIS: 52 DEGREES
EKG P-R INTERVAL: 148 MS
EKG Q-T INTERVAL: 374 MS
EKG QRS DURATION: 72 MS
EKG QTC CALCULATION (BAZETT): 469 MS
EKG R AXIS: 57 DEGREES
EKG T AXIS: 30 DEGREES
EKG VENTRICULAR RATE: 95 BPM
EOSINOPHIL # BLD: 0.07 K/UL (ref 0.05–0.5)
EOSINOPHILS RELATIVE PERCENT: 2 % (ref 0–6)
ERYTHROCYTE [DISTWIDTH] IN BLOOD BY AUTOMATED COUNT: 15.2 % (ref 11.5–15)
GFR, ESTIMATED: >90 ML/MIN/1.73M2
GFR, ESTIMATED: >90 ML/MIN/1.73M2
GLUCOSE SERPL-MCNC: 221 MG/DL (ref 74–99)
GLUCOSE SERPL-MCNC: 222 MG/DL (ref 74–99)
HCT VFR BLD AUTO: 37.8 % (ref 34–48)
HGB BLD-MCNC: 11.8 G/DL (ref 11.5–15.5)
LYMPHOCYTES NFR BLD: 0.1 K/UL (ref 1.5–4)
LYMPHOCYTES RELATIVE PERCENT: 3 % (ref 20–42)
MAGNESIUM SERPL-MCNC: 2.5 MG/DL (ref 1.6–2.6)
MCH RBC QN AUTO: 29.2 PG (ref 26–35)
MCHC RBC AUTO-ENTMCNC: 31.2 G/DL (ref 32–34.5)
MCV RBC AUTO: 93.6 FL (ref 80–99.9)
MONOCYTES NFR BLD: 0.31 K/UL (ref 0.1–0.95)
MONOCYTES NFR BLD: 8 % (ref 2–12)
NEUTROPHILS NFR BLD: 88 % (ref 43–80)
NEUTS SEG NFR BLD: 3.51 K/UL (ref 1.8–7.3)
PLATELET # BLD AUTO: 289 K/UL (ref 130–450)
PMV BLD AUTO: 9.7 FL (ref 7–12)
POTASSIUM SERPL-SCNC: 4.5 MMOL/L (ref 3.5–5)
POTASSIUM SERPL-SCNC: 5.4 MMOL/L (ref 3.5–5)
RBC # BLD AUTO: 4.04 M/UL (ref 3.5–5.5)
RBC # BLD: ABNORMAL 10*6/UL
SODIUM SERPL-SCNC: 136 MMOL/L (ref 132–146)
SODIUM SERPL-SCNC: 137 MMOL/L (ref 132–146)
WBC OTHER # BLD: 4 K/UL (ref 4.5–11.5)

## 2024-08-02 PROCEDURE — 97165 OT EVAL LOW COMPLEX 30 MIN: CPT

## 2024-08-02 PROCEDURE — 97161 PT EVAL LOW COMPLEX 20 MIN: CPT | Performed by: PHYSICAL THERAPIST

## 2024-08-02 PROCEDURE — 6370000000 HC RX 637 (ALT 250 FOR IP): Performed by: FAMILY MEDICINE

## 2024-08-02 PROCEDURE — 93010 ELECTROCARDIOGRAM REPORT: CPT | Performed by: INTERNAL MEDICINE

## 2024-08-02 PROCEDURE — 99239 HOSP IP/OBS DSCHRG MGMT >30: CPT | Performed by: INTERNAL MEDICINE

## 2024-08-02 PROCEDURE — 80048 BASIC METABOLIC PNL TOTAL CA: CPT

## 2024-08-02 PROCEDURE — 97530 THERAPEUTIC ACTIVITIES: CPT | Performed by: PHYSICAL THERAPIST

## 2024-08-02 PROCEDURE — 6360000002 HC RX W HCPCS: Performed by: FAMILY MEDICINE

## 2024-08-02 PROCEDURE — 85025 COMPLETE CBC W/AUTO DIFF WBC: CPT

## 2024-08-02 PROCEDURE — 2580000003 HC RX 258: Performed by: FAMILY MEDICINE

## 2024-08-02 PROCEDURE — 36415 COLL VENOUS BLD VENIPUNCTURE: CPT

## 2024-08-02 PROCEDURE — 99223 1ST HOSP IP/OBS HIGH 75: CPT | Performed by: NURSE PRACTITIONER

## 2024-08-02 PROCEDURE — 97530 THERAPEUTIC ACTIVITIES: CPT

## 2024-08-02 RX ORDER — ONDANSETRON 4 MG/1
4 TABLET, FILM COATED ORAL EVERY 8 HOURS PRN
COMMUNITY

## 2024-08-02 RX ORDER — OXYCODONE HYDROCHLORIDE 10 MG/1
10 TABLET ORAL EVERY 12 HOURS PRN
COMMUNITY

## 2024-08-02 RX ORDER — ATORVASTATIN CALCIUM 80 MG/1
80 TABLET, FILM COATED ORAL DAILY
COMMUNITY

## 2024-08-02 RX ORDER — GABAPENTIN 300 MG/1
300 CAPSULE ORAL DAILY PRN
COMMUNITY

## 2024-08-02 RX ORDER — ALBUTEROL SULFATE 90 UG/1
2 AEROSOL, METERED RESPIRATORY (INHALATION) EVERY 4 HOURS PRN
COMMUNITY

## 2024-08-02 RX ORDER — CLOPIDOGREL BISULFATE 75 MG/1
75 TABLET ORAL DAILY
COMMUNITY

## 2024-08-02 RX ORDER — OXYCODONE HYDROCHLORIDE 5 MG/1
10 TABLET ORAL EVERY 4 HOURS PRN
Status: DISCONTINUED | OUTPATIENT
Start: 2024-08-02 | End: 2024-08-02 | Stop reason: HOSPADM

## 2024-08-02 RX ORDER — SERTRALINE HYDROCHLORIDE 100 MG/1
100 TABLET, FILM COATED ORAL DAILY
COMMUNITY

## 2024-08-02 RX ORDER — FENTANYL 37.5 UG/H
37.5 PATCH, EXTENDED RELEASE TRANSDERMAL
COMMUNITY

## 2024-08-02 RX ORDER — METOPROLOL SUCCINATE 25 MG/1
25 TABLET, EXTENDED RELEASE ORAL DAILY
COMMUNITY

## 2024-08-02 RX ORDER — FENTANYL CITRATE 0.05 MG/ML
50 INJECTION, SOLUTION INTRAMUSCULAR; INTRAVENOUS
Status: DISCONTINUED | OUTPATIENT
Start: 2024-08-02 | End: 2024-08-02 | Stop reason: HOSPADM

## 2024-08-02 RX ORDER — FENTANYL CITRATE 0.05 MG/ML
25 INJECTION, SOLUTION INTRAMUSCULAR; INTRAVENOUS
Status: DISCONTINUED | OUTPATIENT
Start: 2024-08-02 | End: 2024-08-02 | Stop reason: HOSPADM

## 2024-08-02 RX ORDER — ROPINIROLE 0.25 MG/1
0.25 TABLET, FILM COATED ORAL NIGHTLY PRN
COMMUNITY

## 2024-08-02 RX ORDER — PROCHLORPERAZINE MALEATE 10 MG
10 TABLET ORAL EVERY 6 HOURS PRN
Qty: 30 TABLET | Refills: 0 | Status: SHIPPED | OUTPATIENT
Start: 2024-08-02

## 2024-08-02 RX ADMIN — FENTANYL CITRATE 50 MCG: 50 INJECTION INTRAMUSCULAR; INTRAVENOUS at 08:15

## 2024-08-02 RX ADMIN — PANTOPRAZOLE SODIUM 40 MG: 40 TABLET, DELAYED RELEASE ORAL at 05:40

## 2024-08-02 RX ADMIN — SODIUM CHLORIDE, PRESERVATIVE FREE 10 ML: 5 INJECTION INTRAVENOUS at 08:14

## 2024-08-02 RX ADMIN — FENTANYL CITRATE 50 MCG: 50 INJECTION INTRAMUSCULAR; INTRAVENOUS at 14:59

## 2024-08-02 RX ADMIN — SODIUM CHLORIDE, PRESERVATIVE FREE 10 ML: 5 INJECTION INTRAVENOUS at 00:52

## 2024-08-02 RX ADMIN — INSULIN GLARGINE 10 UNITS: 100 INJECTION, SOLUTION SUBCUTANEOUS at 11:04

## 2024-08-02 RX ADMIN — FENTANYL CITRATE 50 MCG: 50 INJECTION INTRAMUSCULAR; INTRAVENOUS at 11:02

## 2024-08-02 RX ADMIN — SODIUM CHLORIDE, POTASSIUM CHLORIDE, SODIUM LACTATE AND CALCIUM CHLORIDE: 600; 310; 30; 20 INJECTION, SOLUTION INTRAVENOUS at 00:51

## 2024-08-02 RX ADMIN — POTASSIUM BICARBONATE 50 MEQ: 978 TABLET, EFFERVESCENT ORAL at 01:01

## 2024-08-02 ASSESSMENT — PAIN DESCRIPTION - LOCATION
LOCATION: BACK

## 2024-08-02 ASSESSMENT — PAIN SCALES - GENERAL
PAINLEVEL_OUTOF10: 8
PAINLEVEL_OUTOF10: 8
PAINLEVEL_OUTOF10: 9

## 2024-08-02 ASSESSMENT — PAIN DESCRIPTION - DESCRIPTORS
DESCRIPTORS: DISCOMFORT;SHARP;OTHER (COMMENT)
DESCRIPTORS: DISCOMFORT;OTHER (COMMENT)
DESCRIPTORS: OTHER (COMMENT);DISCOMFORT

## 2024-08-02 NOTE — CARE COORDINATION
Case Management Assessment  Initial Evaluation    Date/Time of Evaluation: 8/2/2024 10:43 AM  Assessment Completed by: CANDICE Orellana    If patient is discharged prior to next notation, then this note serves as note for discharge by case management.    Patient Name: Devi Lema                   YOB: 1960  Diagnosis: Hypocalcemia [E83.51]  Hypokalemia [E87.6]  Hypomagnesemia [E83.42]  Intractable nausea and vomiting [R11.2]  Compression fracture of body of thoracic vertebra (HCC) [S22.000A]  Nausea and vomiting, unspecified vomiting type [R11.2]                   Date / Time: 8/1/2024  3:21 PM    Patient Admission Status: Inpatient   Readmission Risk (Low < 19, Mod (19-27), High > 27): Readmission Risk Score: 13.1    Current PCP: Allison Tomlin, DO  PCP verified by CM? Yes    Chart Reviewed: Yes      History Provided by: Patient  Patient Orientation: Alert and Oriented, Person, Place, Situation    Patient Cognition: Alert    Hospitalization in the last 30 days (Readmission):    If yes, Readmission Assessment in CM Navigator will be completed.    Advance Directives:      Code Status: Full Code   Patient's Primary Decision Maker is: Legal Next of Kin    Primary Decision Maker: Perez Lema - Spouse - [phone]    Discharge Planning:    Patient lives with: spouse Type of Home: 2 story with first floor set up, one small step to enter    Primary Care Giver: Self    Patient Support Systems include:   Current Financial resources:    Current community resources:    Current services prior to admission: [facility] at home              Current DME:  has home 02, portable tanks, cannot recall DME company, currently on room air here, has nebulizer, walker, wc cane, walk in shower with seat.               Type of Home Care services:      ADLS  Prior functional level: Independent in ADLs/IADLs  Current functional level: Other (see comment) (therapy ordered, pt denies need for HHC at  [R11.2]

## 2024-08-02 NOTE — PROGRESS NOTES
OCCUPATIONAL THERAPY INITIAL EVALUATION    [facility]  [address] [place]        Date:2024                                                  Patient Name: Devi Lema    MRN: [id number]    : 1960    Room: [address] [place] [address]      Evaluating OT: Marlon Stahl OTR/L; #[id number]     Referring Provider and Specific Provider Orders/Date:      24  OT eval and treat  Start:  24,   End:  24,   ONE TIME,   Standing Count:  1 Occurrences,   R         Cintia Heaton, DO      Placement Recommendation: Home no occupational therapy       Diagnosis:   1. Hypokalemia    2. Nausea and vomiting, unspecified vomiting type    3. Hypomagnesemia    4. Hypocalcemia    5. Compression fracture of body of thoracic vertebra (HCC)         Surgery: None      Pertinent Medical History:       Past Medical History:   Diagnosis Date    Adenocarcinoma of right lung (HCC) 2022    Anxiety     Bulging of intervertebral disc between L4 and L5     Cataracts, bilateral     initial stage    Diabetes mellitus type 2, uncomplicated (HCC) 02/10/2016    Essential hypertension 02/10/2016    Graves disease     Hearing loss     Hypothyroidism     Mixed hyperlipidemia 02/10/2016    Myocardial infarction (HCC)     x2    Neuropathy     Seizures (HCC)     Seizures (HCC)     Grand mal    TIA (transient ischemic attack)     Type II or unspecified type diabetes mellitus without mention of complication, not stated as uncontrolled          Past Surgical History:   Procedure Laterality Date    APPENDECTOMY      CORONARY STENT PLACEMENT  2022    HYSTERECTOMY (CERVIX STATUS UNKNOWN)      HYSTERECTOMY (CERVIX STATUS UNKNOWN)      ovaries out    HYSTERECTOMY, TOTAL ABDOMINAL (CERVIX REMOVED)      TONSILLECTOMY          Precautions:  Fall Risk, Up with Assistance, Weight-bear as tolerated bilateral lower extremities, T6 and T8 compression fractures.      Assessment  RUE strength  for participation in functional tasks   LUE WFL 4/5 good  and wfl FMC/dexterity noted during ADL tasks   Improve overall LUE strength  for participation in functional tasks         Hearing: WFL   Sensation:  No c/o numbness or tingling  Tone: WFL   Edema: None    Comments: Nursing approved therapy session. Upon arrival the patient was supine with HOB elevated.  At end of session, patient was supine with HOB elevated with call light and phone within reach, all lines and tubes intact.  Overall patient demonstrated decreased independence and safety during completion of ADL/functional transfer/mobility tasks.  Pt would benefit from continued skilled OT to increase safety and independence with completion of ADL/IADL tasks for functional independence and quality of life.    Treatment: OT treatment provided this date includes:   Instruction/training on safety and adapted techniques for completion of ADLs   Instruction/training on safe functional mobility/transfer techniques   Instruction/training on energy conservation/work simplification for completion of ADLs   Instruction/training on proper positioning/alignment to prevent contractures    Neuromuscular Reeducation to facilitate balance/righting reactions for increased function with ADLs tasks      Rehab Potential: Good for established goals.      Patient / Family Goal: Patient would like to return home and return to OC re: ADLs and IADLs          Patient and/or family were instructed on functional diagnosis, prognosis/goals and OT plan of care. Demonstrated good understanding.     Eval Complexity: Low  History: Brief review of medical records and additional review of physical, cognitive, or psychosocial history related to current functional performance  Exam: 3+ performance deficits  Assistance/Modification: Mod assistance or modifications required to perform tasks. May have comorbidities that affect occupational performance.    Time In: 2:46 PM    Time Out: 3:09 PM    Total Treatment Time: 8      Min Units   OT Eval Low 97165  X  1    OT Eval Medium 80388      OT Eval High 03092      OT Re-Eval 54979            ADL/Self Care 92293     Therapeutic Activities 04846  8 1   Therapeutic Ex 06771       Orthotic Management 20430       Manual 48156     Neuro Re-Ed 20668       Non-Billable Time        Evaluation Time additionally includes thorough review of current medical information, gathering information on past medical history/social history and prior level of function, interpretation of standardized testing/informal observation of tasks, assessment of data and development of plan of care and goals.        Evaluating OT: Marlon Stahl OTR/L; #[id number]

## 2024-08-02 NOTE — H&P
the thoracic o and r lumbar spine. SOFT TISSUES: No paraspinal mass is seen.     1. No evidence of pulmonary embolism or other acute cardiopulmonary process. 2. Postsurgical changes right upper lobectomy with post therapy changes of fibrosis or scarring in the right lung.  Persistent mediastinal adenopathy right paratracheal and subcarinal similar to PET-CT findings where this head increased activity with continued attention on follow-up at these sites 3. No evidence of intra-abdominal or intrapelvic metastatic disease. 4. No acute osseous findings of the lumbar spine. 5. Developed from prior comparison chest 02/27/2024 there has been interval compression deformities T6 and T8 levels with T6 level 100% height loss vertebral plana and T8 50% height loss without significant retropulsion of either segment or paraspinal hematoma age indeterminate for pathologic fractures however no clear osseous lesion associated these sites with increased sclerosis of the T6 vertebral level likely from compression involvement.     CT Head W/O Contrast    Result Date: 8/1/2024  EXAMINATION: CT OF THE HEAD WITHOUT CONTRAST  8/1/2024 6:05 pm TECHNIQUE: CT of the head was performed without the administration of intravenous contrast. Automated exposure control, iterative reconstruction, and/or weight based adjustment of the mA/kV was utilized to reduce the radiation dose to as low as reasonably achievable. COMPARISON: None. HISTORY: ORDERING SYSTEM PROVIDED HISTORY: fall TECHNOLOGIST PROVIDED HISTORY: Has a \"code stroke\" or \"stroke alert\" been called?->No Reason for exam:->fall Decision Support Exception - unselect if not a suspected or confirmed emergency medical condition->Emergency Medical Condition (MA) FINDINGS: BRAIN/VENTRICLES: There is no acute intracranial hemorrhage, mass effect or midline shift.  No abnormal extra-axial fluid collection.  The gray-white differentiation is maintained without evidence of an acute infarct.  There is  allergic to morphine  - hold brilinta and ASA until evaluated by ortho    Stage 4 lung cancer  - follows at [facility]  - last immunotherapy treatment yesterday    Type 2 DM  - SSI  - hypoglycemia protocol    HTN  HLD  - continue home BP meds    Code Status: Full  DVT prophylaxis: Lovenox    NOTE: This report was transcribed using voice recognition software. Every effort was made to ensure accuracy; however, inadvertent computerized transcription errors may be present.     Electronically signed by Cintia Heaton DO on 8/1/2024 at 8:25 PM

## 2024-08-02 NOTE — PROGRESS NOTES
Physical Therapy Initial Evaluation/Plan of Care    Room #:  0423/0423-01  Patient Name: Devi Lema  YOB: 1960  MRN: [id number]    Date of Service: 8/2/2024     Tentative placement recommendation: Home with no Physical Therapy needs  Equipment recommendation: Patient has needed equipment       Evaluating Physical Therapist: Noa Luciano, PT #[id number]      Specific Provider Orders/Date/Referring Provider :  08/01/24 2030    PT evaluation and treat  Start:  08/01/24 2030,   End:  08/01/24 2030,   ONE TIME,   Standing Count:  1 Occurrences,   R       iCntia Heaton DO    Admitting Diagnosis:   Hypocalcemia [E83.51]  Hypokalemia [E87.6]  Hypomagnesemia [E83.42]  Intractable nausea and vomiting [R11.2]  Compression fracture of body of thoracic vertebra (HCC) [S22.000A]  Nausea and vomiting, unspecified vomiting type [R11.2]     was walking with her walker and did not see her dog started to run and tripped over him.  She is unsure if she hit her head as everything happened so quickly.  She also notes significant nausea and vomiting for the past week.  Surgery: none  Visit Diagnoses         Codes    Hypokalemia    -  Primary E87.6    Nausea and vomiting, unspecified vomiting type     R11.2    Hypomagnesemia     E83.42    Hypocalcemia     E83.51    Compression fracture of body of thoracic vertebra (HCC)     S22.000A            Patient Active Problem List   Diagnosis    Diabetes mellitus type 2, uncomplicated (HCC)    Essential hypertension    Mixed hyperlipidemia    Gastroesophageal reflux disease    Seizure disorder (HCC)    Adenocarcinoma of right lung (HCC)    Intractable nausea and vomiting        ASSESSMENT of Current Deficits Patient exhibits decreased strength, balance, and endurance impairing functional mobility, transfers, gait , gait distance, and tolerance to activity unsteady gait, improved balance with wheeled walker.  Patient requires skilled physical therapy to address concerns  bedrails?: A Little  How much help is needed moving to and from a bed to a chair?: A Little  How much help is needed standing up from a chair using your arms?: A Little  How much help is needed walking in hospital room?: A Little  How much help is needed climbing 3-5 steps with a railing?: A Little  AM-[facility] Inpatient Mobility Raw Score : 18  AM-PAC Inpatient T-Scale Score : 43.63  Mobility Inpatient CMS 0-100% Score: 46.58  Mobility Inpatient CMS G-Code Modifier : CK    Nursing cleared patient for PT evaluation. The admitting diagnosis and active problem list as listed above have been reviewed prior to the initiation of this evaluation.    OBJECTIVE:   Initial Evaluation  Date: 8/2/2024 Treatment Date:     Short Term/ Long Term   Goals   Was pt agreeable to Eval/treatment? Yes  To be met in 2 days   Pain level   7-8/10  back     Bed Mobility  Using rails and head of bed elevated:       Rolling: Supervision     Supine to sit: Supervision     Sit to supine: Not assessed     Scooting: Supervision     Rolling: Independent    Supine to sit: Independent    Sit to supine: Independent    Scooting: Independent     Transfers Sit to stand: Minimal assist of 1 first rep, supervision consecutive stands,  Cues for hand placement and safety   Sit to stand: Modified Independent     Ambulation     1 x 20 feet no device minimal assist for balance and safety, , 2 x 90 feet using  wheeled walker with Supervision    Patient with unsteady gait, no loss of balance and cues for safety and pacing    120 feet using  wheeled walker with Modified Independent      Stair negotiation: ascended and descended   Not assessed     1 step, Supervision        ROM Within functional limits    Increase range of motion 10% of affected joints    Strength BUE:  refer to OT eval  RLE:  4-/5  LLE:  4-/5  Increase strength in affected mm groups by 1/3 grade   Balance Sitting EOB:  good    Dynamic Standing:  fair no device, fair + wheeled walker   Sitting EOB:

## 2024-08-02 NOTE — CONSULTS
Palliative Care Department  [phone]  Palliative Care Initial Consult  Provider April Fernandez, APRN - CNP     Devi Lema  [id number]  Hospital Day: 2  Date of Initial Consult: 8/1/2024  Referring Provider: Cintia Heaton DO  Palliative Medicine was consulted for assistance with: Psychosocial distress, overwhelming symptoms    HPI:   Devi Lema is a 63 y.o. with a medical history of  stage 4 lung cancer on immunotherapy, type 2 DM, HTN, HLD, seizures, thyroid disease who was admitted on 8/1/2024 from home with a CHIEF COMPLAINT of fall after her dog tripped her while walking with her walker.  She also has been vomiting for the past week.  Had an immunotherapy infusion at [facility] yesterday, had bloodwork done yesterday as well.  States she follows with [place] palliative care, but her pain has not been adequately controlled and she questions what the point is of continuing treatment for her cancer if she is in so much pain all the time.  EMS pulled her fentanyl patch off on the way to ED, she had just put a new patch on today.  Workup in ED today significant for K 2.3, CO2 13, calcium 4.8, ionized calcium 1.20, Mg 1.0, WBC 4.3.  CT head, c-spine, l-spine no acute finding.  CT t-spine RUL lobectomy with postsurgical changes, persistent mediastinal adenopathy, T6 and T8 compression fractures.  CT abd/pelvis no acute finding.  Ortho consulted.  Palliative medicine consulted for further assistance.    ASSESSMENT/PLAN:     Pertinent Hospital Diagnoses     Intractable nausea and vomiting  Compression fractures of T6 and T8  Stage IV lung cancer      Palliative Care Encounter / Counseling Regarding Goals of Care  Please see detailed goals of care discussion as below  At this time, Devi Lema, Does have capacity for medical decision-making.  Capacity is time limited and situation/question specific  During encounter there was no surrogate medical decision-maker  Outcome of goals of care meeting:  experiences chest discomfort.  The patient states that her pain is currently tolerable.  She is on IV fentanyl every 2 hours as needed.  Would recommend continuing fentanyl patch at home and oxycodone IR 10 mg every 4 hours as needed.  The patient states that she has medication at home.  She denies nausea, vomiting.  She states that she had a bowel movement today and that her appetite has been okay.  She will follow-up with [facility] palliative medicine outpatient.  She has been undergoing immunotherapy at [facility].  He would like to continue current medical management and treatment.  We did discuss CODE STATUS options cardiac arrest, she would not want chest compressions.  She would like to change her CODE STATUS to DNR CCA.  She has interest in completing advanced directives in the near future.  Blank advanced directive forms provided to her.  Support provided.  The patient will discharge home today. There are no further PM needs at this time.  PM will now sign off.  If new PM needs arise, please re-consult.  Thank you.      OBJECTIVE:   Prognosis: Guarded    Physical Exam:  /76   Pulse 72   Temp 98.2 °F (36.8 °C) (Axillary)   Resp 18   Ht 1.575 m (5' 2\")   Wt 55.2 kg (121 lb 9.6 oz)   SpO2 98%   BMI 22.24 kg/m²   Constitutional:   awake, alert  Eyes: no scleral icterus, normal lids, no discharge  ENMT:  Normocephalic, atraumatic, mucosa moist, EOMI  Neck:  trachea midline, no JVD  Lungs:  CTA bilaterally  Heart::  RRR  Abd:  Soft, non tender, non distended, bowel sounds present  Ext:  Moving all extremities, no edema, pulses present  Skin:  Warm and dry, no rashes on visible skin  Psych: non-anxious affect  Neuro:  PERRL, Alert, grossly nonfocal; following commands    Objective data reviewed: labs, images, records, medication use, vitals, and chart    Discussed patient and the plan of care with the other IDT members: Floor Nurse, Patient, and Family    Time/Communication  Greater than 50% of time spent,

## 2024-08-02 NOTE — PROGRESS NOTES
Pharmacy Discharge Reconciliation & Education    Counseled patient on the importance of adherence with new medications. Patient was by herself.  New medication(s), compazine as she feels the PRN zofran is not working anymore. We already went through ALL of her medications to fix her home medication list prior to the discharge being completed.  Discussed the purpose of each medication, as well as the dose, frequency, and common side effects/mitigation strategies.  All questions asked were answered or deferred to the inpatient team providers. Patient encouraged to reach out with any other additional questions or concerns that they may have.  Patient verbalized understanding.    Zahida Medley, PharmD 8/2/2024 3:20 PM  W: [phone]

## 2024-08-02 NOTE — PROGRESS NOTES
Pharmacy Medication Reconciliation    The patient was interviewed regarding current PTA medication list, use and drug allergies. Patient was by herself. The patient was questioned regarding use of any other inhalers, topical products, over the counter medications, herbal medications, vitamin products or ophthalmic/nasal/otic medication use.      Allergy Update: Metformin and related, Morphine, and Morphine and codeine    Recommendations/Findings/Discrepancies:   The following amendments were made to the patient's active medication list on file:   1) Additions: albuterol PRN   - Fentanyl patch  - Gabapentin PRN   - Requip PRN     2) Deletions: ticagrelor (on clopidogrel), rosuvastatin (on atorvastatin), losartan    3) Changes: Updated insulin aspart to 0.5 units TID with meals  - insulin glargine to 30 units daily  - methimazole to 2.5 mg daily   - oxycodone to 10 mg every 12 hours PRN     Total number of discrepancies: 11     Source/s of information: patient, Care Everywhere, OARRS, surescripts, electronic medical record     Thank you,  Zahida Medley, PharmD 8/2/2024 1:32 PM  SJW: [phone]

## 2024-08-02 NOTE — PLAN OF CARE
Problem: Pain  Goal: Verbalizes/displays adequate comfort level or baseline comfort level  8/2/2024 1245 by Clifton Ascencio, RN  Outcome: Progressing     Problem: Safety - Adult  Goal: Free from fall injury  8/2/2024 1245 by Clifton Ascencio, RN  Outcome: Progressing

## 2024-08-02 NOTE — ACP (ADVANCE CARE PLANNING)
Advance Care Planning       The patient has appointed the following active healthcare agents:    Primary Decision Maker: Perez Lema - [facility] - [phone]    The Patient has the following current code status:    Code Status: Full Code

## 2024-08-02 NOTE — DISCHARGE INSTRUCTIONS
Your information:  Name: Devi Lema  : 1960    Your instructions:Follow up with  you primary care physician during the next 7 days after discharge    You are being discharged home. Please follow up with your PCP and take your medications as prescribed.     IF YOU EXPERIENCE ANY OF THE FOLLOWING SYMPTOMS, CHEST PAIN, SHORTNESS OF BREATH, COUGHING UP BLOOD OR BLOODY SPUTUM, STOMACH PAIN OR CRAMPING, DARK, TARRY STOOLS, LOSS OF APPETITE, GENERAL NOT FEELING WELL, SIGNS AND SYMPTOMS OF INFECTION LIKE FEVER AND OR CHILLS, PLEASE CALL DR JUSTIN OR RETURN TO THE EMERGENCY ROOM.     What to do after you leave the hospital:    Recommended diet: regular diet    Recommended activity: activity as tolerated        The following personal items were collected during your admission and were returned to you:    Belongings  Dental Appliances: None  Vision - Corrective Lenses: Eyeglasses  Hearing Aid: None  Clothing: Pajamas  Jewelry: None  Body Piercings Removed: N/A  Electronic Devices: Cell Phone  Weapons (Notify Protective Services/Security): None  Home Medications: Other (Comment) (in a pink bag in closet)  Valuables Given To: Patient  Provide Name(s) of Who Valuable(s) Were Given To: na    Information obtained by:  By signing below, I understand that if any problems occur once I leave the hospital I am to contact PCP.  I understand and acknowledge receipt of the instructions indicated above.

## 2024-08-02 NOTE — CONSULTS
Department of Orthopedic Surgery  Resident Consult Note    Reason for Consult: T6, T8 compression fractures    HISTORY OF PRESENT ILLNESS:       Patient is a 63 y.o. female who presents with back pain after sustaining fall.  Patient reports she was walking her dog when she fell using her walker.  Complains of back pain, reports she has had chronic back pain in the past.  ED workup demonstrated chronic T6 compression fracture, chronic T8 compression fracture.  Patient currently reporting mid back pain, denies any other orthopedic complaints.  Denies distal numbness/ting/paresthesias.    Past Medical History:        Diagnosis Date    Adenocarcinoma of right lung (formerly [facility]) 8/29/2022    Anxiety     Bulging of intervertebral disc between L4 and L5     Cataracts, bilateral     initial stage    Diabetes mellitus type 2, uncomplicated (formerly [facility]) 02/10/2016    Essential hypertension 02/10/2016    Graves disease     Hearing loss     Hypothyroidism     Mixed hyperlipidemia 02/10/2016    Myocardial infarction (formerly [facility])     x2    Neuropathy     Seizures (formerly [facility])     Seizures (formerly [facility])     Grand mal    TIA (transient ischemic attack)     Type II or unspecified type diabetes mellitus without mention of complication, not stated as uncontrolled      Past Surgical History:        Procedure Laterality Date    APPENDECTOMY      CORONARY STENT PLACEMENT  08/2022    HYSTERECTOMY (CERVIX STATUS UNKNOWN)      HYSTERECTOMY (CERVIX STATUS UNKNOWN)      ovaries out    HYSTERECTOMY, TOTAL ABDOMINAL (CERVIX REMOVED)      TONSILLECTOMY       Current Medications:   Current Facility-Administered Medications: fentaNYL (SUBLIMAZE) injection 25 mcg, 25 mcg, IntraVENous, Q2H PRN **OR** fentaNYL (SUBLIMAZE) injection 50 mcg, 50 mcg, IntraVENous, Q2H PRN  sodium chloride flush 0.9 % injection 5-40 mL, 5-40 mL, IntraVENous, 2 times per day  sodium chloride flush 0.9 % injection 5-40 mL, 5-40 mL, IntraVENous, PRN  0.9 % sodium chloride infusion, , IntraVENous, PRN  potassium  chloride (KLOR-CON M) extended release tablet 40 mEq, 40 mEq, Oral, PRN **OR** potassium bicarb-citric acid (EFFER-K) effervescent tablet 40 mEq, 40 mEq, Oral, PRN **OR** potassium chloride 10 mEq/100 mL IVPB (Peripheral Line), 10 mEq, IntraVENous, PRN  magnesium sulfate 2000 mg in 50 mL IVPB premix, 2,000 mg, IntraVENous, PRN  enoxaparin (LOVENOX) injection 40 mg, 40 mg, SubCUTAneous, Daily  ondansetron (ZOFRAN-ODT) disintegrating tablet 4 mg, 4 mg, Oral, Q8H PRN **OR** ondansetron (ZOFRAN) injection 4 mg, 4 mg, IntraVENous, Q6H PRN  acetaminophen (TYLENOL) tablet 650 mg, 650 mg, Oral, Q6H PRN **OR** acetaminophen (TYLENOL) suppository 650 mg, 650 mg, Rectal, Q6H PRN  lactated ringers IV soln infusion, , IntraVENous, Continuous  prochlorperazine (COMPAZINE) injection 10 mg, 10 mg, IntraVENous, Q6H PRN  insulin glargine (LANTUS) injection vial 10 Units, 10 Units, SubCUTAneous, QAM  losartan (COZAAR) tablet 50 mg, 50 mg, Oral, Daily  methIMAzole (TAPAZOLE) tablet 2.5 mg, 2.5 mg, Oral, Daily  pantoprazole (PROTONIX) tablet 40 mg, 40 mg, Oral, QAM AC  rosuvastatin (CRESTOR) tablet 10 mg, 10 mg, Oral, Nightly  insulin lispro (HUMALOG,ADMELOG) injection vial 0-4 Units, 0-4 Units, SubCUTAneous, TID WC  insulin lispro (HUMALOG,ADMELOG) injection vial 0-4 Units, 0-4 Units, SubCUTAneous, Nightly  glucose chewable tablet 16 g, 4 tablet, Oral, PRN  dextrose bolus 10% 125 mL, 125 mL, IntraVENous, PRN **OR** dextrose bolus 10% 250 mL, 250 mL, IntraVENous, PRN  glucagon injection 1 mg, 1 mg, SubCUTAneous, PRN  dextrose 10 % infusion, , IntraVENous, Continuous PRN  Allergies:  Metformin and related, Morphine, and Morphine and codeine    Social History:   TOBACCO:   reports that she quit smoking about 2 years ago. Her smoking use included cigarettes. She started smoking about 47 years ago. She has a 33.8 pack-year smoking history. She has been exposed to tobacco smoke. She has never used smokeless tobacco.  ETOH:   reports no  history of alcohol use.  DRUGS:   reports no history of drug use.  ACTIVITIES OF DAILY LIVING:    OCCUPATION:   Family History:       Problem Relation Age of Onset    High Blood Pressure Mother     Heart Disease Mother     Diabetes Father     Obesity Sister     High Blood Pressure Sister     Colon Cancer Brother     Heart Disease Maternal Aunt     Cancer Maternal Aunt         patient doesn't know primary.    Cancer Paternal Aunt         Hodgkin's    No Known Problems Paternal Uncle     Breast Cancer Maternal Grandmother     Heart Attack Maternal Grandfather     Heart Disease Paternal Grandmother     Diabetes Paternal Grandmother     No Known Problems Maternal Cousin     No Known Problems Paternal Cousin     No Known Problems Son     Other Daughter         Roman Ceron    Asthma Grandchild        REVIEW OF SYSTEMS:  CONSTITUTIONAL:  negative for  fevers, chills  EYES:  negative for blurred vision, visual disturbance  HEENT:  negative for  hearing loss, voice change  RESPIRATORY:  negative for  dyspnea, wheezing  CARDIOVASCULAR:  negative for  chest pain, palpitations  GASTROINTESTINAL:  negative for nausea, vomiting  GENITOURINARY:  negative for frequency, urinary incontinence  HEMATOLOGIC/LYMPHATIC:  negative for bleeding and petechiae  MUSCULOSKELETAL:  positive for  pain  NEUROLOGICAL:  negative for headaches, dizziness  BEHAVIOR/PSYCH:  negative for increased agitation and anxiety    PHYSICAL EXAM:    VITALS:  BP (!) 154/80   Pulse 80   Temp 98.2 °F (36.8 °C) (Oral)   Resp 18   Ht 1.575 m (5' 2\")   Wt 55.2 kg (121 lb 9.6 oz)   SpO2 98%   BMI 22.24 kg/m²   CONSTITUTIONAL:  awake, alert, cooperative, no apparent distress, and appears stated age  HENT: Head: Atraumatic.   Eyes: EOMI  Neck: Trachea midline  Cardiovascular: Brisk capillary refill to all extremities, extremities well perfused  Pulmonary/Chest: No increased work of breathing, no cough  Abdominal: Non-distended  Neurologic:  Awake, alert and

## 2024-08-02 NOTE — ED PROVIDER NOTES
Breast Cancer Maternal Grandmother     Heart Attack Maternal Grandfather     Heart Disease Paternal Grandmother     Diabetes Paternal Grandmother     No Known Problems Maternal Cousin     No Known Problems Paternal Cousin     No Known Problems Son     Other Daughter         Roman Ceron    Asthma Grandchild         SOCIAL HISTORY       Social History     Tobacco Use    Smoking status: Former     Current packs/day: 0.00     Average packs/day: 0.8 packs/day for 45.0 years (33.8 ttl pk-yrs)     Types: Cigarettes     Start date: 6/3/1977     Quit date: 6/3/2022     Years since quittin.1     Passive exposure: Past    Smokeless tobacco: Never    Tobacco comments:     chantix   Vaping Use    Vaping Use: Never used   Substance Use Topics    Alcohol use: No    Drug use: No       SCREENINGS        [place] Coma Scale  Eye Opening: Spontaneous  Best Verbal Response: Oriented  Best Motor Response: Obeys commands  [place] Coma Scale Score: 15                CIWA Assessment  BP: (!) 133/107  Pulse: 98           PHYSICAL EXAM  1 or more Elements     ED Triage Vitals   BP Temp Temp Source Pulse Respirations SpO2 Height Weight - Scale   24 1615 24 1709 24 1709 24 1615 24 1615 24 1615 24 1529 24 1529   (!) 154/80 99.2 °F (37.3 °C) Oral 89 18 97 % 1.575 m (5' 2\") 52.6 kg (116 lb)       Constitutional/General: Alert and oriented x3, tearful  Head: Normocephalic and atraumatic  Eyes: PERRL, EOMI, conjunctiva normal, sclera non icteric  ENT:  Oropharynx clear, handling secretions, no trismus, no asymmetry of the posterior oropharynx or uvular edema  Neck: Supple, full ROM, no stridor, no meningeal signs  Respiratory: Lungs clear to auscultation bilaterally, no wheezes, rales, or rhonchi. Not in respiratory distress  Cardiovascular:  Regular rate. Regular rhythm. No murmurs, no gallops, no rubs. 2+ distal pulses. Equal extremity pulses.   Chest: No chest wall tenderness  GI:  Abdomen  low as reasonably achievable.; CT of the abdomen and pelvis was performed with the administration of intravenous contrast. Multiplanar reformatted images are provided for review. Automated exposure control, iterative reconstruction, and/or weight based adjustment of the mA/kV was utilized to reduce the radiation dose to as low as reasonably achievable.; CT of the lumbar spine was performed without the administration of intravenous contrast. Multiplanar reformatted images are provided for review.  Adjustment of mA and/or kV according to patient size was utilized. Automated exposure control, iterative reconstruction, and/or weight based adjustment of the mA/kV was utilized to reduce the radiation dose to as low as reasonably achievable.; CT of the thoracic spine was performed without the administration of intravenous contrast. Multiplanar reformatted images are provided for review. Automated exposure control, iterative reconstruction, and/or weight based adjustment of the mA/kV was utilized to reduce the radiation dose to as low as reasonably achievable. COMPARISON: PET-CT dated 10/09/2023, CT chest 02/27/2024 HISTORY: ORDERING SYSTEM PROVIDED HISTORY: chest pain, hx cancer TECHNOLOGIST PROVIDED HISTORY: Reason for exam:->chest pain, hx cancer Additional Contrast?->1; ORDERING SYSTEM PROVIDED HISTORY: nasuea, cancer TECHNOLOGIST PROVIDED HISTORY: Additional Contrast?->None Reason for exam:->nasuea, cancer Decision Support Exception - unselect if not a suspected or confirmed emergency medical condition->Emergency Medical Condition (MA); ORDERING SYSTEM PROVIDED HISTORY: fall TECHNOLOGIST PROVIDED HISTORY: Reason for exam:->fall Decision Support Exception - unselect if not a suspected or confirmed emergency medical condition->Emergency Medical Condition (MA); ORDERING SYSTEM PROVIDED HISTORY: fall TECHNOLOGIST PROVIDED HISTORY: Reason for exam:->fall CHEST: Pulmonary Arteries: Pulmonary arteries are adequately opacified  1.0, calcium 4.8, glucose was 140.  Bicarb is 13, remainder of her labs are unremarkable.  CT head, C-spine, T-spine, L-spine as well as CT abdomen pelvis and CT pulmonary were performed.  She was found to have a T6 compression fracture that was significantly worse as well as a T8 compression fracture, no retropulsion.  She is neuro intact.  She was initially given IV fluids as well as droperidol which significantly improved her pain and vomiting.  She subsequently had replacements of her electrolyte derangements with magnesium, calcium and potassium.  Additionally she was given fentanyl for pain.  Case was discussed with Dr. Heaton for admission.    The patient was placed on the cardiac monitor for continuous monitoring of rhythm and vitals. EKG ordered to evaluate patient's current cardiac rate, rhythm, and QT interval. CBC was ordered as part of my assessment for possible infection, anemia or thrombocytopenia. BMP to assess electrolytes, kidney function or any metabolic derangements. Hepatic function panel to evaluate for liver/biliary disease. Lipase to evaluate for pancreatitis. Lactic acid as a marker of hypoperfusion or ischemia. Urinalysis to evaluate for a UTI. Troponin as a marker for myocardial ischemia or heart strain. Blood cultures ordered for suspicion of bacteremia. CT head without contrast to evaluate for hemorrhage or masses. CT abdomen for, but without limitation to, ureterolithiasis, nephrolithiasis, constipation, hollow organ perforation, small bowel obstruction, bowel ischemia, pneumoperitoneum, diverticulitis, cholecystitis, appendicitis, intra-abdominal abscess, or malignancy. CT chest for, but without limitation to, pulmonary embolism, effusions, pneumonia, dissection or acute bony fractures. CT head and cervical spine for hemorrhages, fractures, subluxation or displacement. CT thoracic and lumbar spine for any fractures or displacements due to history of presenting symptoms.      CONSULTS:

## 2024-08-02 NOTE — PROGRESS NOTES
Spiritual Health Assessment/Progress Note  [facility]    (P) Palliative Care,  ,  ,      Name: Devi Lema MRN: [id number]    Age: 63 y.o.     Sex: female   Language: English   Zoroastrian: Muslim   Intractable nausea and vomiting     Date: 8/2/2024                           Spiritual Assessment began in [facility] MED SURG TELE        Referral/Consult From: (P) Palliative Care   Encounter Overview/Reason: (P) Palliative Care  Service Provided For: (P) Patient    [place]bey, Belief, Meaning:   Patient identifies as spiritual  Family/Friends No family/friends present      Importance and Influence:  Patient has spiritual/personal beliefs that influence decisions regarding their health  Family/Friends no family/friends present    Community:  Patient Other: Not connected to abbey community has family support.      Assessment and Plan of Care:     Patient Interventions include: Facilitated expression of thoughts and feelings, Affirmed coping skills/support systems, and Assisted in Advance Care Planning conversation      Patient Plan of Care: Spiritual Care available upon further referral  Family/Friends Plan of Care: Spiritual Care available upon further referral    Provided contact information and she is going to talk to her family regarding ACP.     Electronically signed by LOS Coombs on 8/2/2024 at 2:55 PM

## 2024-08-02 NOTE — PROGRESS NOTES
4 Eyes Skin Assessment     NAME:  Devi Lema  YOB: 1960  MEDICAL RECORD NUMBER:  [id number]    The patient is being assessed for  Admission    I agree that at least one RN has performed a thorough Head to Toe Skin Assessment on the patient. ALL assessment sites listed below have been assessed.      Areas assessed by both nurses:    Head, Face, Ears, Shoulders, Back, Chest, Arms, Elbows, Hands, Sacrum. Buttock, Coccyx, Ischium, Legs. Feet and Heels, and Under Medical Devices         Does the Patient have a Wound? No noted wound(s)       Kev Prevention initiated by RN: No  Wound Care Orders initiated by RN: No    Pressure Injury (Stage 3,4, Unstageable, DTI, NWPT, and Complex wounds) if present, place Wound referral order by RN under : No    New Ostomies, if present place, Ostomy referral order under : No     Nurse 1 eSignature: Electronically signed by Tomorrow SAL Stahl on 8/2/24 at 3:42 AM EDT    **SHARE this note so that the co-signing nurse can place an eSignature**    Nurse 2 eSignature: {Esignature:[id number]}

## 2024-08-02 NOTE — DISCHARGE SUMMARY
[facility] Hospitalist       Hospitalist Physician Discharge Summary       No follow-up provider specified.    Activity level: Slowly increase as tolerated    Diet: ADULT DIET; Regular    Labs: None are pending at the discharge    Condition at discharge: Stable    Dispo: Return to home setting     Patient ID:  Devi Lema  [id number]  63 y.o.  1960    Admit date: 8/1/2024    Discharge date and time:  8/2/2024  1:04 PM    Admission Diagnoses: Principal Problem:    Intractable nausea and vomiting  Active Problems:    Adenocarcinoma of right lung (HCC)    Seizure disorder (HCC)    Diabetes mellitus type 2, uncomplicated (HCC)    Essential hypertension    Mixed hyperlipidemia  Resolved Problems:    * No resolved hospital problems. *      Discharge Diagnoses: Principal Problem:    Intractable nausea and vomiting  Active Problems:    Adenocarcinoma of right lung (HCC)    Seizure disorder (HCC)    Diabetes mellitus type 2, uncomplicated (HCC)    Essential hypertension    Mixed hyperlipidemia  Resolved Problems:    * No resolved hospital problems. *      Consults:  IP CONSULT TO ORTHOPEDIC SURGERY  IP CONSULT TO PALLIATIVE CARE    Procedures: None significant except if described in hospital course.    Hospital Course: History of present illness from History and Physical:   63 y.o. female with a history of stage 4 lung cancer on immunotherapy, type 2 DM, HTN, HLD, seizures, thyroid disease presents with fall after her dog tripper her while walking with a walker.  She also has been vomiting for the past week.  Had an immunotherapy infusion at [facility] yesterday, had bloodwork done yesterday as well.  States she follows with [place] palliative care, but her pain has not been adequately controlled and she questions what the point is of continuing treatment for her cancer if she is in so much pain all the time.  EMS pulled her fentanyl patch off on the way to ED, she had just put a new patch on today.  Workup  low as reasonably achievable.; CT of the lumbar spine was performed without the administration of intravenous contrast. Multiplanar reformatted images are provided for review.  Adjustment of mA and/or kV according to patient size was utilized. Automated exposure control, iterative reconstruction, and/or weight based adjustment of the mA/kV was utilized to reduce the radiation dose to as low as reasonably achievable.; CT of the thoracic spine was performed without the administration of intravenous contrast. Multiplanar reformatted images are provided for review. Automated exposure control, iterative reconstruction, and/or weight based adjustment of the mA/kV was utilized to reduce the radiation dose to as low as reasonably achievable. COMPARISON: PET-CT dated 10/09/2023, CT chest 02/27/2024 HISTORY: ORDERING SYSTEM PROVIDED HISTORY: chest pain, hx cancer TECHNOLOGIST PROVIDED HISTORY: Reason for exam:->chest pain, hx cancer Additional Contrast?->1; ORDERING SYSTEM PROVIDED HISTORY: nasuea, cancer TECHNOLOGIST PROVIDED HISTORY: Additional Contrast?->None Reason for exam:->nasuea, cancer Decision Support Exception - unselect if not a suspected or confirmed emergency medical condition->Emergency Medical Condition (MA); ORDERING SYSTEM PROVIDED HISTORY: fall TECHNOLOGIST PROVIDED HISTORY: Reason for exam:->fall Decision Support Exception - unselect if not a suspected or confirmed emergency medical condition->Emergency Medical Condition (MA); ORDERING SYSTEM PROVIDED HISTORY: fall TECHNOLOGIST PROVIDED HISTORY: Reason for exam:->fall CHEST: Pulmonary Arteries: Pulmonary arteries are adequately opacified for evaluation.  No evidence of intraluminal filling defect to suggest pulmonary embolism.  Main pulmonary artery is normal in caliber. Mediastinum: Mediastinal with somewhat soft tissue thickening versus residual subcarinal adenopathy up to 2.2 cm short axis dimension similar to PET-CT along with similar appearance of

## 2024-08-04 LAB
MICROORGANISM SPEC CULT: NORMAL
MICROORGANISM SPEC CULT: NORMAL
SERVICE CMNT-IMP: NORMAL
SERVICE CMNT-IMP: NORMAL
SPECIMEN DESCRIPTION: NORMAL
SPECIMEN DESCRIPTION: NORMAL

## 2024-08-14 NOTE — PROGRESS NOTES
Physician Progress Note      PATIENT:               ALEX ANTOINE  CSN #:                  [id number]  :                       1960  ADMIT DATE:       2024 3:21 PM  DISCH DATE:        2024 4:01 PM  RESPONDING  PROVIDER #:        Johny Medina MD          QUERY TEXT:    Pt admitted with intractable nausea and vomiting. Pt noted to have lung cancer   and received immunotherapy infusion yesterday. If possible, please document   in progress notes and discharge summary if you are evaluating and /or treating   any of the following:  The medical record reflects the following:  Risk Factors: Stage 4 lung CA on immunotherapy, HTN, HLS, T2DM, thyroid   disease, chronic T6, T8 compression fractures, hypokalemia, hypomagnesemia,   hypocalcemia  Clinical Indicators: Neurosurgery consult-chronic nature T6, T8 compression   fractures. Palliative care- CT abd/pelvis no acute finding. In ED K 2.3, CO2   13, calcium 4.8, ionized calcium 1.20, Mg 1.0, WBC 4.3. HP- intractable N/V,   replace electrolytes, prn compazine, zofran  Treatment:  immunotherapy infusion at [facility] yesterday, calcium gluconate,   droperidol, magnesium, potassium, 1L bolus and fentanyl, labs, consults,   monitoring, CT    Thank you,  Kylah Conde RN, CRCR  [phone]  Options provided:  -- Intractable nausea and vomiting related to electrolyte imbalances  -- Intractable nausea and vomiting related to immunotherapy  -- Intractable nausea and vomiting related to Lung Cancer  -- Other - I will add my own diagnosis  -- Disagree - Not applicable / Not valid  -- Disagree - Clinically unable to determine / Unknown  -- Refer to Clinical Documentation Reviewer    PROVIDER RESPONSE TEXT:    Intractable nausea and vomiting related to electrolyte imbalances    Query created by: Kylah Conde on 2024 3:47 PM      Electronically signed by:  Johny Medina MD 2024 1:55 PM

## 2025-06-27 ENCOUNTER — APPOINTMENT (OUTPATIENT)
Dept: CT IMAGING | Age: 65
End: 2025-06-27
Payer: MEDICARE

## 2025-06-27 ENCOUNTER — HOSPITAL ENCOUNTER (EMERGENCY)
Age: 65
Discharge: ANOTHER HOSPITAL | End: 2025-06-27
Attending: EMERGENCY MEDICINE
Payer: MEDICARE

## 2025-06-27 ENCOUNTER — APPOINTMENT (OUTPATIENT)
Dept: GENERAL RADIOLOGY | Age: 65
End: 2025-06-27
Payer: MEDICARE

## 2025-06-27 ENCOUNTER — HOSPITAL ENCOUNTER (INPATIENT)
Age: 65
LOS: 1 days | Discharge: HOME | DRG: 176 | End: 2025-06-30
Attending: STUDENT IN AN ORGANIZED HEALTH CARE EDUCATION/TRAINING PROGRAM | Admitting: INTERNAL MEDICINE
Payer: MEDICARE

## 2025-06-27 VITALS
HEART RATE: 65 BPM | TEMPERATURE: 98.7 F | OXYGEN SATURATION: 98 % | RESPIRATION RATE: 19 BRPM | SYSTOLIC BLOOD PRESSURE: 160 MMHG | DIASTOLIC BLOOD PRESSURE: 105 MMHG

## 2025-06-27 DIAGNOSIS — S22.060A CLOSED WEDGE COMPRESSION FRACTURE OF T8 VERTEBRA, INITIAL ENCOUNTER (HCC): ICD-10-CM
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
## 2025-06-27 DIAGNOSIS — T14.90XA TRAUMA: Primary | ICD-10-CM

## 2025-06-27 DIAGNOSIS — S09.90XA CLOSED HEAD INJURY, INITIAL ENCOUNTER: ICD-10-CM

## 2025-06-27 DIAGNOSIS — I26.93 SINGLE SUBSEGMENTAL PULMONARY EMBOLISM WITHOUT ACUTE COR PULMONALE (HCC): Primary | ICD-10-CM

## 2025-06-27 DIAGNOSIS — I26.99 ACUTE PULMONARY EMBOLISM WITHOUT ACUTE COR PULMONALE, UNSPECIFIED PULMONARY EMBOLISM TYPE (HCC): ICD-10-CM

## 2025-06-27 DIAGNOSIS — S22.060A COMPRESSION FRACTURE OF T8 VERTEBRA, INITIAL ENCOUNTER (HCC): ICD-10-CM

## 2025-06-27 LAB
ALBUMIN SERPL-MCNC: 3.6 G/DL (ref 3.5–5.2)
ALP SERPL-CCNC: 87 U/L (ref 35–104)
ALT SERPL-CCNC: 16 U/L (ref 0–35)
ANION GAP SERPL CALCULATED.3IONS-SCNC: 11 MMOL/L (ref 7–16)
ANION GAP SERPL CALCULATED.3IONS-SCNC: 9 MMOL/L (ref 7–16)
AST SERPL-CCNC: 33 U/L (ref 0–35)
BASOPHILS # BLD: 0.01 K/UL (ref 0–0.2)
BASOPHILS # BLD: 0.02 K/UL (ref 0–0.2)
BASOPHILS NFR BLD: 0 % (ref 0–2)
BASOPHILS NFR BLD: 0 % (ref 0–2)
BILIRUB SERPL-MCNC: 0.9 MG/DL (ref 0–1.2)
BUN SERPL-MCNC: 10 MG/DL (ref 8–23)
BUN SERPL-MCNC: 13 MG/DL (ref 8–23)
CALCIUM SERPL-MCNC: 8.3 MG/DL (ref 8.8–10.2)
CALCIUM SERPL-MCNC: 8.3 MG/DL (ref 8.8–10.2)
CHLORIDE SERPL-SCNC: 103 MMOL/L (ref 98–107)
CHLORIDE SERPL-SCNC: 109 MMOL/L (ref 98–107)
CK SERPL-CCNC: 89 U/L (ref 0–170)
CO2 SERPL-SCNC: 22 MMOL/L (ref 22–29)
CO2 SERPL-SCNC: 22 MMOL/L (ref 22–29)
CREAT SERPL-MCNC: 0.7 MG/DL (ref 0.5–1)
CREAT SERPL-MCNC: 0.7 MG/DL (ref 0.5–1)
EKG ATRIAL RATE: 71 BPM
EKG P AXIS: 62 DEGREES
EKG P-R INTERVAL: 162 MS
EKG Q-T INTERVAL: 432 MS
EKG QRS DURATION: 84 MS
EKG QTC CALCULATION (BAZETT): 469 MS
EKG R AXIS: 67 DEGREES
EKG T AXIS: 54 DEGREES
EKG VENTRICULAR RATE: 71 BPM
EOSINOPHIL # BLD: 0.09 K/UL (ref 0.05–0.5)
EOSINOPHIL # BLD: 0.1 K/UL (ref 0.05–0.5)
EOSINOPHILS RELATIVE PERCENT: 2 % (ref 0–6)
EOSINOPHILS RELATIVE PERCENT: 2 % (ref 0–6)
ERYTHROCYTE [DISTWIDTH] IN BLOOD BY AUTOMATED COUNT: 12.9 % (ref 11.5–15)
ERYTHROCYTE [DISTWIDTH] IN BLOOD BY AUTOMATED COUNT: 12.9 % (ref 11.5–15)
GFR, ESTIMATED: >90 ML/MIN/1.73M2
GFR, ESTIMATED: >90 ML/MIN/1.73M2
GLUCOSE SERPL-MCNC: 192 MG/DL (ref 74–99)
GLUCOSE SERPL-MCNC: 98 MG/DL (ref 74–99)
HCT VFR BLD AUTO: 33.2 % (ref 34–48)
HCT VFR BLD AUTO: 33.7 % (ref 34–48)
HGB BLD-MCNC: 11.3 G/DL (ref 11.5–15.5)
HGB BLD-MCNC: 11.5 G/DL (ref 11.5–15.5)
IMM GRANULOCYTES # BLD AUTO: <0.03 K/UL (ref 0–0.58)
IMM GRANULOCYTES # BLD AUTO: <0.03 K/UL (ref 0–0.58)
IMM GRANULOCYTES NFR BLD: 0 % (ref 0–5)
IMM GRANULOCYTES NFR BLD: 0 % (ref 0–5)
LYMPHOCYTES NFR BLD: 0.72 K/UL (ref 1.5–4)
LYMPHOCYTES NFR BLD: 0.86 K/UL (ref 1.5–4)
LYMPHOCYTES RELATIVE PERCENT: 13 % (ref 20–42)
LYMPHOCYTES RELATIVE PERCENT: 19 % (ref 20–42)
MCH RBC QN AUTO: 30.5 PG (ref 26–35)
MCH RBC QN AUTO: 31.2 PG (ref 26–35)
MCHC RBC AUTO-ENTMCNC: 33.5 G/DL (ref 32–34.5)
MCHC RBC AUTO-ENTMCNC: 34.6 G/DL (ref 32–34.5)
MCV RBC AUTO: 90 FL (ref 80–99.9)
MCV RBC AUTO: 91.1 FL (ref 80–99.9)
MONOCYTES NFR BLD: 0.4 K/UL (ref 0.1–0.95)
MONOCYTES NFR BLD: 0.47 K/UL (ref 0.1–0.95)
MONOCYTES NFR BLD: 8 % (ref 2–12)
MONOCYTES NFR BLD: 9 % (ref 2–12)
NEUTROPHILS NFR BLD: 70 % (ref 43–80)
NEUTROPHILS NFR BLD: 76 % (ref 43–80)
NEUTS SEG NFR BLD: 3.23 K/UL (ref 1.8–7.3)
NEUTS SEG NFR BLD: 4.29 K/UL (ref 1.8–7.3)
PARTIAL THROMBOPLASTIN TIME: 26.8 SEC (ref 24.5–35.1)
PLATELET # BLD AUTO: 212 K/UL (ref 130–450)
PLATELET # BLD AUTO: 240 K/UL (ref 130–450)
PMV BLD AUTO: 8.9 FL (ref 7–12)
PMV BLD AUTO: 9.5 FL (ref 7–12)
POTASSIUM SERPL-SCNC: 3.8 MMOL/L (ref 3.5–5.1)
POTASSIUM SERPL-SCNC: 4.2 MMOL/L (ref 3.5–5.1)
PROT SERPL-MCNC: 6.3 G/DL (ref 6.4–8.3)
RBC # BLD AUTO: 3.69 M/UL (ref 3.5–5.5)
RBC # BLD AUTO: 3.7 M/UL (ref 3.5–5.5)
SODIUM SERPL-SCNC: 134 MMOL/L (ref 136–145)
SODIUM SERPL-SCNC: 142 MMOL/L (ref 136–145)
TROPONIN I SERPL HS-MCNC: <6 NG/L (ref 0–14)
WBC OTHER # BLD: 4.6 K/UL (ref 4.5–11.5)
WBC OTHER # BLD: 5.6 K/UL (ref 4.5–11.5)

## 2025-06-27 PROCEDURE — 96375 TX/PRO/DX INJ NEW DRUG ADDON: CPT

## 2025-06-27 PROCEDURE — 96374 THER/PROPH/DIAG INJ IV PUSH: CPT

## 2025-06-27 PROCEDURE — 85730 THROMBOPLASTIN TIME PARTIAL: CPT

## 2025-06-27 PROCEDURE — 93010 ELECTROCARDIOGRAM REPORT: CPT | Performed by: INTERNAL MEDICINE

## 2025-06-27 PROCEDURE — 74177 CT ABD & PELVIS W/CONTRAST: CPT

## 2025-06-27 PROCEDURE — 2580000003 HC RX 258: Performed by: STUDENT IN AN ORGANIZED HEALTH CARE EDUCATION/TRAINING PROGRAM

## 2025-06-27 PROCEDURE — 72131 CT LUMBAR SPINE W/O DYE: CPT

## 2025-06-27 PROCEDURE — 6360000002 HC RX W HCPCS

## 2025-06-27 PROCEDURE — 6360000002 HC RX W HCPCS: Performed by: EMERGENCY MEDICINE

## 2025-06-27 PROCEDURE — 70450 CT HEAD/BRAIN W/O DYE: CPT

## 2025-06-27 PROCEDURE — 96365 THER/PROPH/DIAG IV INF INIT: CPT

## 2025-06-27 PROCEDURE — 6830039000 HC L3 TRAUMA ALERT

## 2025-06-27 PROCEDURE — 85025 COMPLETE CBC W/AUTO DIFF WBC: CPT

## 2025-06-27 PROCEDURE — 71045 X-RAY EXAM CHEST 1 VIEW: CPT

## 2025-06-27 PROCEDURE — 71260 CT THORAX DX C+: CPT

## 2025-06-27 PROCEDURE — 72128 CT CHEST SPINE W/O DYE: CPT

## 2025-06-27 PROCEDURE — G0378 HOSPITAL OBSERVATION PER HR: HCPCS

## 2025-06-27 PROCEDURE — 96376 TX/PRO/DX INJ SAME DRUG ADON: CPT

## 2025-06-27 PROCEDURE — 96366 THER/PROPH/DIAG IV INF ADDON: CPT

## 2025-06-27 PROCEDURE — 82550 ASSAY OF CK (CPK): CPT

## 2025-06-27 PROCEDURE — 84484 ASSAY OF TROPONIN QUANT: CPT

## 2025-06-27 PROCEDURE — 73090 X-RAY EXAM OF FOREARM: CPT

## 2025-06-27 PROCEDURE — 80048 BASIC METABOLIC PNL TOTAL CA: CPT

## 2025-06-27 PROCEDURE — 6360000004 HC RX CONTRAST MEDICATION: Performed by: RADIOLOGY

## 2025-06-27 PROCEDURE — 99285 EMERGENCY DEPT VISIT HI MDM: CPT

## 2025-06-27 PROCEDURE — 72125 CT NECK SPINE W/O DYE: CPT

## 2025-06-27 PROCEDURE — 80053 COMPREHEN METABOLIC PANEL: CPT

## 2025-06-27 PROCEDURE — 99223 1ST HOSP IP/OBS HIGH 75: CPT | Performed by: NURSE PRACTITIONER

## 2025-06-27 PROCEDURE — 93005 ELECTROCARDIOGRAM TRACING: CPT

## 2025-06-27 PROCEDURE — 99284 EMERGENCY DEPT VISIT MOD MDM: CPT | Performed by: SURGERY

## 2025-06-27 PROCEDURE — 70498 CT ANGIOGRAPHY NECK: CPT

## 2025-06-27 PROCEDURE — 93005 ELECTROCARDIOGRAM TRACING: CPT | Performed by: STUDENT IN AN ORGANIZED HEALTH CARE EDUCATION/TRAINING PROGRAM

## 2025-06-27 PROCEDURE — 71275 CT ANGIOGRAPHY CHEST: CPT

## 2025-06-27 PROCEDURE — 6360000002 HC RX W HCPCS: Performed by: STUDENT IN AN ORGANIZED HEALTH CARE EDUCATION/TRAINING PROGRAM

## 2025-06-27 RX ORDER — IOPAMIDOL 755 MG/ML
75 INJECTION, SOLUTION INTRAVASCULAR
Status: COMPLETED | OUTPATIENT
Start: 2025-06-27 | End: 2025-06-27

## 2025-06-27 RX ORDER — GABAPENTIN 300 MG/1
300 CAPSULE ORAL DAILY PRN
Status: DISCONTINUED | OUTPATIENT
Start: 2025-06-27 | End: 2025-06-27

## 2025-06-27 RX ORDER — OXYCODONE HYDROCHLORIDE 10 MG/1
10 TABLET ORAL EVERY 4 HOURS PRN
Refills: 0 | Status: DISCONTINUED | OUTPATIENT
Start: 2025-06-27 | End: 2025-06-30 | Stop reason: HOSPADM

## 2025-06-27 RX ORDER — MAGNESIUM SULFATE IN WATER 40 MG/ML
2000 INJECTION, SOLUTION INTRAVENOUS PRN
Status: DISCONTINUED | OUTPATIENT
Start: 2025-06-27 | End: 2025-06-30 | Stop reason: HOSPADM

## 2025-06-27 RX ORDER — METOPROLOL SUCCINATE 25 MG/1
25 TABLET, EXTENDED RELEASE ORAL DAILY
Status: DISCONTINUED | OUTPATIENT
Start: 2025-06-28 | End: 2025-06-30 | Stop reason: HOSPADM

## 2025-06-27 RX ORDER — POLYETHYLENE GLYCOL 3350 17 G/17G
17 POWDER, FOR SOLUTION ORAL DAILY PRN
Status: DISCONTINUED | OUTPATIENT
Start: 2025-06-27 | End: 2025-06-30 | Stop reason: HOSPADM

## 2025-06-27 RX ORDER — SERTRALINE HYDROCHLORIDE 100 MG/1
100 TABLET, FILM COATED ORAL DAILY
Status: DISCONTINUED | OUTPATIENT
Start: 2025-06-28 | End: 2025-06-30 | Stop reason: HOSPADM

## 2025-06-27 RX ORDER — ACETAMINOPHEN 500 MG
1000 TABLET ORAL ONCE
Status: DISCONTINUED | OUTPATIENT
Start: 2025-06-27 | End: 2025-06-27 | Stop reason: HOSPADM

## 2025-06-27 RX ORDER — FENTANYL CITRATE 0.05 MG/ML
INJECTION, SOLUTION INTRAMUSCULAR; INTRAVENOUS
Status: DISCONTINUED
Start: 2025-06-27 | End: 2025-06-27 | Stop reason: HOSPADM

## 2025-06-27 RX ORDER — FENTANYL CITRATE 0.05 MG/ML
INJECTION, SOLUTION INTRAMUSCULAR; INTRAVENOUS DAILY PRN
Status: COMPLETED | OUTPATIENT
Start: 2025-06-27 | End: 2025-06-27

## 2025-06-27 RX ORDER — FENTANYL CITRATE 50 UG/ML
50 INJECTION, SOLUTION INTRAMUSCULAR; INTRAVENOUS ONCE
Status: COMPLETED | OUTPATIENT
Start: 2025-06-27 | End: 2025-06-27

## 2025-06-27 RX ORDER — ACETAMINOPHEN 650 MG/1
650 SUPPOSITORY RECTAL EVERY 6 HOURS PRN
Status: DISCONTINUED | OUTPATIENT
Start: 2025-06-27 | End: 2025-06-30 | Stop reason: HOSPADM

## 2025-06-27 RX ORDER — ACETAMINOPHEN 325 MG/1
650 TABLET ORAL EVERY 6 HOURS PRN
Status: DISCONTINUED | OUTPATIENT
Start: 2025-06-27 | End: 2025-06-30 | Stop reason: HOSPADM

## 2025-06-27 RX ORDER — HYDROMORPHONE HYDROCHLORIDE 1 MG/ML
1 INJECTION, SOLUTION INTRAMUSCULAR; INTRAVENOUS; SUBCUTANEOUS ONCE
Refills: 0 | Status: DISCONTINUED | OUTPATIENT
Start: 2025-06-27 | End: 2025-06-27 | Stop reason: HOSPADM

## 2025-06-27 RX ORDER — ONDANSETRON 4 MG/1
4 TABLET, ORALLY DISINTEGRATING ORAL EVERY 8 HOURS PRN
Status: DISCONTINUED | OUTPATIENT
Start: 2025-06-27 | End: 2025-06-30 | Stop reason: HOSPADM

## 2025-06-27 RX ORDER — METHIMAZOLE 5 MG/1
5 TABLET ORAL DAILY
Status: DISCONTINUED | OUTPATIENT
Start: 2025-06-28 | End: 2025-06-30 | Stop reason: HOSPADM

## 2025-06-27 RX ORDER — SODIUM CHLORIDE 0.9 % (FLUSH) 0.9 %
5-40 SYRINGE (ML) INJECTION PRN
Status: DISCONTINUED | OUTPATIENT
Start: 2025-06-27 | End: 2025-06-30 | Stop reason: HOSPADM

## 2025-06-27 RX ORDER — OXYCODONE HYDROCHLORIDE 5 MG/1
5 TABLET ORAL EVERY 4 HOURS PRN
Refills: 0 | Status: DISCONTINUED | OUTPATIENT
Start: 2025-06-27 | End: 2025-06-30 | Stop reason: HOSPADM

## 2025-06-27 RX ORDER — HEPARIN SODIUM 10000 [USP'U]/100ML
5-30 INJECTION, SOLUTION INTRAVENOUS CONTINUOUS
Status: DISCONTINUED | OUTPATIENT
Start: 2025-06-27 | End: 2025-06-28

## 2025-06-27 RX ORDER — FENTANYL CITRATE 50 UG/ML
100 INJECTION, SOLUTION INTRAMUSCULAR; INTRAVENOUS
Status: DISCONTINUED | OUTPATIENT
Start: 2025-06-27 | End: 2025-06-27 | Stop reason: HOSPADM

## 2025-06-27 RX ORDER — FENTANYL CITRATE 50 UG/ML
25 INJECTION, SOLUTION INTRAMUSCULAR; INTRAVENOUS
Status: COMPLETED | OUTPATIENT
Start: 2025-06-27 | End: 2025-06-28

## 2025-06-27 RX ORDER — ONDANSETRON 2 MG/ML
4 INJECTION INTRAMUSCULAR; INTRAVENOUS EVERY 6 HOURS PRN
Status: DISCONTINUED | OUTPATIENT
Start: 2025-06-27 | End: 2025-06-30 | Stop reason: HOSPADM

## 2025-06-27 RX ORDER — HEPARIN SODIUM 1000 [USP'U]/ML
80 INJECTION, SOLUTION INTRAVENOUS; SUBCUTANEOUS PRN
Status: DISCONTINUED | OUTPATIENT
Start: 2025-06-27 | End: 2025-06-28

## 2025-06-27 RX ORDER — HEPARIN SODIUM 1000 [USP'U]/ML
40 INJECTION, SOLUTION INTRAVENOUS; SUBCUTANEOUS PRN
Status: DISCONTINUED | OUTPATIENT
Start: 2025-06-27 | End: 2025-06-28

## 2025-06-27 RX ORDER — SODIUM CHLORIDE 0.9 % (FLUSH) 0.9 %
5-40 SYRINGE (ML) INJECTION EVERY 12 HOURS SCHEDULED
Status: DISCONTINUED | OUTPATIENT
Start: 2025-06-27 | End: 2025-06-30 | Stop reason: HOSPADM

## 2025-06-27 RX ORDER — POTASSIUM CHLORIDE 7.45 MG/ML
10 INJECTION INTRAVENOUS PRN
Status: DISCONTINUED | OUTPATIENT
Start: 2025-06-27 | End: 2025-06-30 | Stop reason: HOSPADM

## 2025-06-27 RX ORDER — ATORVASTATIN CALCIUM 40 MG/1
80 TABLET, FILM COATED ORAL DAILY
Status: DISCONTINUED | OUTPATIENT
Start: 2025-06-28 | End: 2025-06-30 | Stop reason: HOSPADM

## 2025-06-27 RX ORDER — SODIUM CHLORIDE 9 MG/ML
INJECTION, SOLUTION INTRAVENOUS PRN
Status: DISCONTINUED | OUTPATIENT
Start: 2025-06-27 | End: 2025-06-30 | Stop reason: HOSPADM

## 2025-06-27 RX ORDER — INSULIN LISPRO 100 [IU]/ML
0-4 INJECTION, SOLUTION INTRAVENOUS; SUBCUTANEOUS
Status: DISCONTINUED | OUTPATIENT
Start: 2025-06-27 | End: 2025-06-30 | Stop reason: HOSPADM

## 2025-06-27 RX ORDER — HEPARIN SODIUM 1000 [USP'U]/ML
80 INJECTION, SOLUTION INTRAVENOUS; SUBCUTANEOUS ONCE
Status: COMPLETED | OUTPATIENT
Start: 2025-06-27 | End: 2025-06-27

## 2025-06-27 RX ORDER — INSULIN GLARGINE 100 [IU]/ML
10 INJECTION, SOLUTION SUBCUTANEOUS NIGHTLY
Status: DISCONTINUED | OUTPATIENT
Start: 2025-06-27 | End: 2025-06-30 | Stop reason: HOSPADM

## 2025-06-27 RX ORDER — SODIUM CHLORIDE 9 MG/ML
INJECTION, SOLUTION INTRAVENOUS CONTINUOUS
Status: DISCONTINUED | OUTPATIENT
Start: 2025-06-27 | End: 2025-06-27 | Stop reason: HOSPADM

## 2025-06-27 RX ORDER — ONDANSETRON 2 MG/ML
4 INJECTION INTRAMUSCULAR; INTRAVENOUS EVERY 6 HOURS PRN
Status: DISCONTINUED | OUTPATIENT
Start: 2025-06-27 | End: 2025-06-27 | Stop reason: HOSPADM

## 2025-06-27 RX ORDER — POTASSIUM CHLORIDE 1500 MG/1
40 TABLET, EXTENDED RELEASE ORAL PRN
Status: DISCONTINUED | OUTPATIENT
Start: 2025-06-27 | End: 2025-06-30 | Stop reason: HOSPADM

## 2025-06-27 RX ORDER — FENTANYL 37.5 UG/H
37.5 PATCH, EXTENDED RELEASE TRANSDERMAL
Status: DISCONTINUED | OUTPATIENT
Start: 2025-06-27 | End: 2025-06-27

## 2025-06-27 RX ADMIN — SODIUM CHLORIDE: 0.9 INJECTION, SOLUTION INTRAVENOUS at 13:44

## 2025-06-27 RX ADMIN — ONDANSETRON 4 MG: 2 INJECTION, SOLUTION INTRAMUSCULAR; INTRAVENOUS at 15:18

## 2025-06-27 RX ADMIN — HEPARIN SODIUM 18 UNITS/KG/HR: 10000 INJECTION, SOLUTION INTRAVENOUS at 22:28

## 2025-06-27 RX ADMIN — IOPAMIDOL 75 ML: 755 INJECTION, SOLUTION INTRAVENOUS at 11:35

## 2025-06-27 RX ADMIN — FENTANYL CITRATE 50 MCG: 50 INJECTION INTRAMUSCULAR; INTRAVENOUS at 20:41

## 2025-06-27 RX ADMIN — HEPARIN SODIUM 4400 UNITS: 1000 INJECTION INTRAVENOUS; SUBCUTANEOUS at 22:25

## 2025-06-27 RX ADMIN — FENTANYL CITRATE 100 MCG: 50 INJECTION INTRAMUSCULAR; INTRAVENOUS at 15:21

## 2025-06-27 RX ADMIN — FENTANYL CITRATE 50 MCG: 50 INJECTION INTRAMUSCULAR; INTRAVENOUS at 17:48

## 2025-06-27 RX ADMIN — FENTANYL CITRATE 50 MCG: 50 INJECTION INTRAMUSCULAR; INTRAVENOUS at 11:08

## 2025-06-27 ASSESSMENT — LIFESTYLE VARIABLES
HOW OFTEN DO YOU HAVE A DRINK CONTAINING ALCOHOL: MONTHLY OR LESS
HOW MANY STANDARD DRINKS CONTAINING ALCOHOL DO YOU HAVE ON A TYPICAL DAY: PATIENT DOES NOT DRINK

## 2025-06-27 ASSESSMENT — PAIN SCALES - GENERAL
PAINLEVEL_OUTOF10: 8

## 2025-06-27 ASSESSMENT — PAIN DESCRIPTION - ORIENTATION: ORIENTATION: MID

## 2025-06-27 ASSESSMENT — PAIN DESCRIPTION - LOCATION
LOCATION: FACE;CHEST
LOCATION: FACE
LOCATION: BACK

## 2025-06-27 ASSESSMENT — PAIN DESCRIPTION - DESCRIPTORS
DESCRIPTORS: ACHING;DISCOMFORT
DESCRIPTORS: ACHING

## 2025-06-27 ASSESSMENT — PAIN - FUNCTIONAL ASSESSMENT
PAIN_FUNCTIONAL_ASSESSMENT: 0-10
PAIN_FUNCTIONAL_ASSESSMENT: ACTIVITIES ARE NOT PREVENTED

## 2025-06-27 ASSESSMENT — PAIN DESCRIPTION - FREQUENCY: FREQUENCY: CONTINUOUS

## 2025-06-27 ASSESSMENT — PAIN DESCRIPTION - ONSET: ONSET: ON-GOING

## 2025-06-27 ASSESSMENT — PAIN DESCRIPTION - PAIN TYPE: TYPE: ACUTE PAIN;CHRONIC PAIN

## 2025-06-27 NOTE — ED NOTES
1051 PATIENT ARRIVED IN ED VIA [facility]  1058 TRAUMA ALERT CALLED VIA   1100 DR BAIG IN ED TO EVALUATE PT
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
## 2025-06-28 PROBLEM — I26.93 SINGLE SUBSEGMENTAL PULMONARY EMBOLISM WITHOUT ACUTE COR PULMONALE (HCC): Status: ACTIVE | Noted: 2025-06-27

## 2025-06-28 LAB
ANION GAP SERPL CALCULATED.3IONS-SCNC: 11 MMOL/L (ref 7–16)
BASOPHILS # BLD: 0 K/UL (ref 0–0.2)
BASOPHILS NFR BLD: 0 % (ref 0–2)
BUN SERPL-MCNC: 10 MG/DL (ref 8–23)
CALCIUM SERPL-MCNC: 8.5 MG/DL (ref 8.8–10.2)
CHLORIDE SERPL-SCNC: 105 MMOL/L (ref 98–107)
CO2 SERPL-SCNC: 20 MMOL/L (ref 22–29)
CREAT SERPL-MCNC: 0.7 MG/DL (ref 0.5–1)
EKG ATRIAL RATE: 72 BPM
EKG P AXIS: 51 DEGREES
EKG P-R INTERVAL: 176 MS
EKG Q-T INTERVAL: 404 MS
EKG QRS DURATION: 72 MS
EKG QTC CALCULATION (BAZETT): 442 MS
EKG R AXIS: 57 DEGREES
EKG T AXIS: 52 DEGREES
EKG VENTRICULAR RATE: 72 BPM
EOSINOPHIL # BLD: 0 K/UL (ref 0.05–0.5)
EOSINOPHILS RELATIVE PERCENT: 0 % (ref 0–6)
ERYTHROCYTE [DISTWIDTH] IN BLOOD BY AUTOMATED COUNT: 13 % (ref 11.5–15)
GFR, ESTIMATED: >90 ML/MIN/1.73M2
GLUCOSE BLD-MCNC: 238 MG/DL (ref 74–99)
GLUCOSE BLD-MCNC: 240 MG/DL (ref 74–99)
GLUCOSE BLD-MCNC: 286 MG/DL (ref 74–99)
GLUCOSE SERPL-MCNC: 264 MG/DL (ref 74–99)
HCT VFR BLD AUTO: 36.6 % (ref 34–48)
HGB BLD-MCNC: 12.2 G/DL (ref 11.5–15.5)
LYMPHOCYTES NFR BLD: 0.33 K/UL (ref 1.5–4)
LYMPHOCYTES RELATIVE PERCENT: 6 % (ref 20–42)
MCH RBC QN AUTO: 30.8 PG (ref 26–35)
MCHC RBC AUTO-ENTMCNC: 33.3 G/DL (ref 32–34.5)
MCV RBC AUTO: 92.4 FL (ref 80–99.9)
METAMYELOCYTES ABSOLUTE COUNT: 0.05 K/UL (ref 0–0.12)
METAMYELOCYTES: 1 % (ref 0–1)
MONOCYTES NFR BLD: 0.05 K/UL (ref 0.1–0.95)
MONOCYTES NFR BLD: 1 % (ref 2–12)
NEUTROPHILS NFR BLD: 92 % (ref 43–80)
NEUTS SEG NFR BLD: 5.07 K/UL (ref 1.8–7.3)
PARTIAL THROMBOPLASTIN TIME: 146 SEC (ref 24.5–35.1)
PARTIAL THROMBOPLASTIN TIME: 67.6 SEC (ref 24.5–35.1)
PLATELET # BLD AUTO: 245 K/UL (ref 130–450)
PMV BLD AUTO: 9.6 FL (ref 7–12)
POTASSIUM SERPL-SCNC: 4.3 MMOL/L (ref 3.5–5.1)
RBC # BLD AUTO: 3.96 M/UL (ref 3.5–5.5)
RBC # BLD: ABNORMAL 10*6/UL
SODIUM SERPL-SCNC: 136 MMOL/L (ref 136–145)
WBC OTHER # BLD: 5.5 K/UL (ref 4.5–11.5)

## 2025-06-28 PROCEDURE — 80048 BASIC METABOLIC PNL TOTAL CA: CPT

## 2025-06-28 PROCEDURE — 85025 COMPLETE CBC W/AUTO DIFF WBC: CPT

## 2025-06-28 PROCEDURE — 6360000002 HC RX W HCPCS: Performed by: NURSE PRACTITIONER

## 2025-06-28 PROCEDURE — 6370000000 HC RX 637 (ALT 250 FOR IP): Performed by: INTERNAL MEDICINE

## 2025-06-28 PROCEDURE — 2500000003 HC RX 250 WO HCPCS: Performed by: NURSE PRACTITIONER

## 2025-06-28 PROCEDURE — 96376 TX/PRO/DX INJ SAME DRUG ADON: CPT

## 2025-06-28 PROCEDURE — 6370000000 HC RX 637 (ALT 250 FOR IP): Performed by: NURSE PRACTITIONER

## 2025-06-28 PROCEDURE — 6370000000 HC RX 637 (ALT 250 FOR IP)

## 2025-06-28 PROCEDURE — G0378 HOSPITAL OBSERVATION PER HR: HCPCS

## 2025-06-28 PROCEDURE — 82962 GLUCOSE BLOOD TEST: CPT

## 2025-06-28 PROCEDURE — 99222 1ST HOSP IP/OBS MODERATE 55: CPT | Performed by: SURGERY

## 2025-06-28 PROCEDURE — 85730 THROMBOPLASTIN TIME PARTIAL: CPT

## 2025-06-28 PROCEDURE — 99222 1ST HOSP IP/OBS MODERATE 55: CPT | Performed by: STUDENT IN AN ORGANIZED HEALTH CARE EDUCATION/TRAINING PROGRAM

## 2025-06-28 PROCEDURE — 93010 ELECTROCARDIOGRAM REPORT: CPT | Performed by: INTERNAL MEDICINE

## 2025-06-28 PROCEDURE — 96366 THER/PROPH/DIAG IV INF ADDON: CPT

## 2025-06-28 PROCEDURE — 36415 COLL VENOUS BLD VENIPUNCTURE: CPT

## 2025-06-28 PROCEDURE — 99232 SBSQ HOSP IP/OBS MODERATE 35: CPT | Performed by: INTERNAL MEDICINE

## 2025-06-28 RX ORDER — METHOCARBAMOL 500 MG/1
500 TABLET, FILM COATED ORAL 4 TIMES DAILY
Status: DISCONTINUED | OUTPATIENT
Start: 2025-06-28 | End: 2025-06-30 | Stop reason: HOSPADM

## 2025-06-28 RX ADMIN — METOPROLOL SUCCINATE 25 MG: 25 TABLET, EXTENDED RELEASE ORAL at 08:26

## 2025-06-28 RX ADMIN — OXYCODONE 10 MG: 5 TABLET ORAL at 03:36

## 2025-06-28 RX ADMIN — METHOCARBAMOL 500 MG: 500 TABLET ORAL at 17:24

## 2025-06-28 RX ADMIN — METHOCARBAMOL 500 MG: 500 TABLET ORAL at 08:26

## 2025-06-28 RX ADMIN — FENTANYL CITRATE 25 MCG: 50 INJECTION INTRAMUSCULAR; INTRAVENOUS at 17:30

## 2025-06-28 RX ADMIN — APIXABAN 10 MG: 5 TABLET, FILM COATED ORAL at 20:02

## 2025-06-28 RX ADMIN — METHOCARBAMOL 500 MG: 500 TABLET ORAL at 12:36

## 2025-06-28 RX ADMIN — INSULIN LISPRO 1 UNITS: 100 INJECTION, SOLUTION INTRAVENOUS; SUBCUTANEOUS at 17:24

## 2025-06-28 RX ADMIN — SODIUM CHLORIDE, PRESERVATIVE FREE 10 ML: 5 INJECTION INTRAVENOUS at 20:03

## 2025-06-28 RX ADMIN — FENTANYL CITRATE 25 MCG: 50 INJECTION INTRAMUSCULAR; INTRAVENOUS at 05:47

## 2025-06-28 RX ADMIN — INSULIN GLARGINE 10 UNITS: 100 INJECTION, SOLUTION SUBCUTANEOUS at 20:02

## 2025-06-28 RX ADMIN — INSULIN LISPRO 2 UNITS: 100 INJECTION, SOLUTION INTRAVENOUS; SUBCUTANEOUS at 08:24

## 2025-06-28 RX ADMIN — OXYCODONE 10 MG: 5 TABLET ORAL at 23:49

## 2025-06-28 RX ADMIN — SERTRALINE 100 MG: 100 TABLET, FILM COATED ORAL at 08:26

## 2025-06-28 RX ADMIN — FENTANYL CITRATE 25 MCG: 50 INJECTION INTRAMUSCULAR; INTRAVENOUS at 14:23

## 2025-06-28 RX ADMIN — FENTANYL CITRATE 25 MCG: 50 INJECTION INTRAMUSCULAR; INTRAVENOUS at 11:14

## 2025-06-28 RX ADMIN — METHIMAZOLE 5 MG: 5 TABLET ORAL at 12:01

## 2025-06-28 RX ADMIN — METHOCARBAMOL 500 MG: 500 TABLET ORAL at 20:01

## 2025-06-28 RX ADMIN — FENTANYL CITRATE 25 MCG: 50 INJECTION INTRAMUSCULAR; INTRAVENOUS at 08:35

## 2025-06-28 RX ADMIN — APIXABAN 10 MG: 5 TABLET, FILM COATED ORAL at 14:22

## 2025-06-28 RX ADMIN — ATORVASTATIN CALCIUM 80 MG: 40 TABLET, FILM COATED ORAL at 08:25

## 2025-06-28 RX ADMIN — OXYCODONE 10 MG: 5 TABLET ORAL at 20:01

## 2025-06-28 RX ADMIN — INSULIN LISPRO 2 UNITS: 100 INJECTION, SOLUTION INTRAVENOUS; SUBCUTANEOUS at 20:03

## 2025-06-28 RX ADMIN — INSULIN LISPRO 1 UNITS: 100 INJECTION, SOLUTION INTRAVENOUS; SUBCUTANEOUS at 12:01

## 2025-06-28 ASSESSMENT — PAIN DESCRIPTION - ORIENTATION
ORIENTATION: LOWER;MID
ORIENTATION: LEFT
ORIENTATION: LOWER
ORIENTATION: MID;LOWER

## 2025-06-28 ASSESSMENT — PAIN DESCRIPTION - DESCRIPTORS
DESCRIPTORS: ACHING;DISCOMFORT;SORE
DESCRIPTORS: ACHING;DISCOMFORT;SORE
DESCRIPTORS: ACHING
DESCRIPTORS: ACHING;DISCOMFORT;SORE
DESCRIPTORS: ACHING;DISCOMFORT;SORE
DESCRIPTORS: ACHING
DESCRIPTORS: ACHING;DISCOMFORT;SORE

## 2025-06-28 ASSESSMENT — PAIN - FUNCTIONAL ASSESSMENT
PAIN_FUNCTIONAL_ASSESSMENT: ACTIVITIES ARE NOT PREVENTED
PAIN_FUNCTIONAL_ASSESSMENT: PREVENTS OR INTERFERES SOME ACTIVE ACTIVITIES AND ADLS
PAIN_FUNCTIONAL_ASSESSMENT: ACTIVITIES ARE NOT PREVENTED

## 2025-06-28 ASSESSMENT — PAIN DESCRIPTION - LOCATION
LOCATION: CHEST
LOCATION: BACK
LOCATION: CHEST;HEAD
LOCATION: CHEST
LOCATION: JAW;CHEST
LOCATION: CHEST;JAW
LOCATION: CHEST;JAW
LOCATION: CHEST;HEAD

## 2025-06-28 ASSESSMENT — PAIN SCALES - GENERAL
PAINLEVEL_OUTOF10: 8
PAINLEVEL_OUTOF10: 8
PAINLEVEL_OUTOF10: 10
PAINLEVEL_OUTOF10: 10
PAINLEVEL_OUTOF10: 9
PAINLEVEL_OUTOF10: 0
PAINLEVEL_OUTOF10: 9
PAINLEVEL_OUTOF10: 8
PAINLEVEL_OUTOF10: 3

## 2025-06-28 ASSESSMENT — ENCOUNTER SYMPTOMS
GASTROINTESTINAL NEGATIVE: 1
SHORTNESS OF BREATH: 1
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
## 2025-06-28 NOTE — PROGRESS NOTES
New consult sent to neurosurgery patient added to list.   New consult sent to [facility] patient added to list.    Consult for Hem/ONC sent to Dr. Kulwant Thomas
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
## 2025-06-28 NOTE — PROGRESS NOTES
4 Eyes Skin Assessment     NAME:  Devi Lema  YOB: 1960  MEDICAL RECORD NUMBER:  [id number]    The patient is being assessed for  Admission    I agree that at least one RN has performed a thorough Head to Toe Skin Assessment on the patient. ALL assessment sites listed below have been assessed.      Areas assessed by both nurses:    Head, Face, Ears, Shoulders, Back, Chest, Arms, Elbows, Hands, Sacrum. Buttock, Coccyx, Ischium, Legs. Feet and Heels, and Under Medical Devices         Does the Patient have a Wound? No noted wound(s)       Kev Prevention initiated by RN: No  Wound Care Orders initiated by RN: No    Pressure Injury (Stage 1,2,3,4, Unstageable, DTI, NWPT, and Complex wounds) if present, place Wound referral order by RN under : No    New Ostomies, if present place, Ostomy referral order under : No     Nurse 1 eSignature: Electronically signed by Malcom Everett RN on 6/28/25 at 6:52 AM EDT    **SHARE this note so that the co-signing nurse can place an eSignature**    Nurse 2 eSignature: {Esignature:[id number]}
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
## 2025-06-29 PROBLEM — I26.99 PULMONARY EMBOLISM ON RIGHT (HCC): Status: ACTIVE | Noted: 2025-06-29

## 2025-06-29 LAB
ANION GAP SERPL CALCULATED.3IONS-SCNC: 10 MMOL/L (ref 7–16)
BASOPHILS # BLD: 0.02 K/UL (ref 0–0.2)
BASOPHILS NFR BLD: 0 % (ref 0–2)
BUN SERPL-MCNC: 11 MG/DL (ref 8–23)
CALCIUM SERPL-MCNC: 8.7 MG/DL (ref 8.8–10.2)
CHLORIDE SERPL-SCNC: 104 MMOL/L (ref 98–107)
CO2 SERPL-SCNC: 23 MMOL/L (ref 22–29)
CREAT SERPL-MCNC: 0.7 MG/DL (ref 0.5–1)
EOSINOPHIL # BLD: 0.15 K/UL (ref 0.05–0.5)
EOSINOPHILS RELATIVE PERCENT: 3 % (ref 0–6)
ERYTHROCYTE [DISTWIDTH] IN BLOOD BY AUTOMATED COUNT: 12.7 % (ref 11.5–15)
GFR, ESTIMATED: 88 ML/MIN/1.73M2
GLUCOSE BLD-MCNC: 202 MG/DL (ref 74–99)
GLUCOSE BLD-MCNC: 227 MG/DL (ref 74–99)
GLUCOSE BLD-MCNC: 241 MG/DL (ref 74–99)
GLUCOSE BLD-MCNC: 309 MG/DL (ref 74–99)
GLUCOSE SERPL-MCNC: 257 MG/DL (ref 74–99)
HCT VFR BLD AUTO: 37.1 % (ref 34–48)
HGB BLD-MCNC: 12.3 G/DL (ref 11.5–15.5)
IMM GRANULOCYTES # BLD AUTO: <0.03 K/UL (ref 0–0.58)
IMM GRANULOCYTES NFR BLD: 0 % (ref 0–5)
LYMPHOCYTES NFR BLD: 0.77 K/UL (ref 1.5–4)
LYMPHOCYTES RELATIVE PERCENT: 17 % (ref 20–42)
MCH RBC QN AUTO: 30.5 PG (ref 26–35)
MCHC RBC AUTO-ENTMCNC: 33.2 G/DL (ref 32–34.5)
MCV RBC AUTO: 92.1 FL (ref 80–99.9)
MONOCYTES NFR BLD: 0.43 K/UL (ref 0.1–0.95)
MONOCYTES NFR BLD: 9 % (ref 2–12)
NEUTROPHILS NFR BLD: 70 % (ref 43–80)
NEUTS SEG NFR BLD: 3.26 K/UL (ref 1.8–7.3)
PLATELET # BLD AUTO: 227 K/UL (ref 130–450)
PMV BLD AUTO: 9.4 FL (ref 7–12)
POTASSIUM SERPL-SCNC: 4.2 MMOL/L (ref 3.5–5.1)
RBC # BLD AUTO: 4.03 M/UL (ref 3.5–5.5)
SODIUM SERPL-SCNC: 137 MMOL/L (ref 136–145)
WBC OTHER # BLD: 4.6 K/UL (ref 4.5–11.5)

## 2025-06-29 PROCEDURE — 6370000000 HC RX 637 (ALT 250 FOR IP)

## 2025-06-29 PROCEDURE — 99232 SBSQ HOSP IP/OBS MODERATE 35: CPT | Performed by: INTERNAL MEDICINE

## 2025-06-29 PROCEDURE — 2500000003 HC RX 250 WO HCPCS: Performed by: NURSE PRACTITIONER

## 2025-06-29 PROCEDURE — 80048 BASIC METABOLIC PNL TOTAL CA: CPT

## 2025-06-29 PROCEDURE — 2060000000 HC ICU INTERMEDIATE R&B

## 2025-06-29 PROCEDURE — 36415 COLL VENOUS BLD VENIPUNCTURE: CPT

## 2025-06-29 PROCEDURE — 6370000000 HC RX 637 (ALT 250 FOR IP): Performed by: INTERNAL MEDICINE

## 2025-06-29 PROCEDURE — 85025 COMPLETE CBC W/AUTO DIFF WBC: CPT

## 2025-06-29 PROCEDURE — 82962 GLUCOSE BLOOD TEST: CPT

## 2025-06-29 PROCEDURE — 6370000000 HC RX 637 (ALT 250 FOR IP): Performed by: NURSE PRACTITIONER

## 2025-06-29 RX ORDER — FENTANYL 25 UG/1
1 PATCH TRANSDERMAL
Refills: 0 | Status: DISCONTINUED | OUTPATIENT
Start: 2025-06-29 | End: 2025-06-29

## 2025-06-29 RX ORDER — GLUCAGON 1 MG/ML
1 KIT INJECTION PRN
Status: DISCONTINUED | OUTPATIENT
Start: 2025-06-29 | End: 2025-06-30 | Stop reason: HOSPADM

## 2025-06-29 RX ORDER — DEXTROSE MONOHYDRATE 100 MG/ML
INJECTION, SOLUTION INTRAVENOUS CONTINUOUS PRN
Status: DISCONTINUED | OUTPATIENT
Start: 2025-06-29 | End: 2025-06-30 | Stop reason: HOSPADM

## 2025-06-29 RX ORDER — FENTANYL 50 UG/1
1 PATCH TRANSDERMAL
Refills: 0 | Status: DISCONTINUED | OUTPATIENT
Start: 2025-06-29 | End: 2025-06-29

## 2025-06-29 RX ORDER — FENTANYL 50 UG/1
1 PATCH TRANSDERMAL
Refills: 0 | Status: DISCONTINUED | OUTPATIENT
Start: 2025-06-29 | End: 2025-06-30 | Stop reason: HOSPADM

## 2025-06-29 RX ADMIN — INSULIN LISPRO 1 UNITS: 100 INJECTION, SOLUTION INTRAVENOUS; SUBCUTANEOUS at 20:55

## 2025-06-29 RX ADMIN — ATORVASTATIN CALCIUM 80 MG: 40 TABLET, FILM COATED ORAL at 08:49

## 2025-06-29 RX ADMIN — OXYCODONE 10 MG: 5 TABLET ORAL at 10:09

## 2025-06-29 RX ADMIN — INSULIN LISPRO 3 UNITS: 100 INJECTION, SOLUTION INTRAVENOUS; SUBCUTANEOUS at 17:30

## 2025-06-29 RX ADMIN — METHOCARBAMOL 500 MG: 500 TABLET ORAL at 17:29

## 2025-06-29 RX ADMIN — SERTRALINE 100 MG: 100 TABLET, FILM COATED ORAL at 20:58

## 2025-06-29 RX ADMIN — APIXABAN 10 MG: 5 TABLET, FILM COATED ORAL at 20:19

## 2025-06-29 RX ADMIN — INSULIN LISPRO 2 UNITS: 100 INJECTION, SOLUTION INTRAVENOUS; SUBCUTANEOUS at 08:49

## 2025-06-29 RX ADMIN — SODIUM CHLORIDE, PRESERVATIVE FREE 10 ML: 5 INJECTION INTRAVENOUS at 20:20

## 2025-06-29 RX ADMIN — METHIMAZOLE 5 MG: 5 TABLET ORAL at 08:49

## 2025-06-29 RX ADMIN — APIXABAN 10 MG: 5 TABLET, FILM COATED ORAL at 08:49

## 2025-06-29 RX ADMIN — METOPROLOL SUCCINATE 25 MG: 25 TABLET, EXTENDED RELEASE ORAL at 08:50

## 2025-06-29 RX ADMIN — INSULIN LISPRO 1 UNITS: 100 INJECTION, SOLUTION INTRAVENOUS; SUBCUTANEOUS at 12:12

## 2025-06-29 RX ADMIN — OXYCODONE 10 MG: 5 TABLET ORAL at 18:33

## 2025-06-29 RX ADMIN — METHOCARBAMOL 500 MG: 500 TABLET ORAL at 20:19

## 2025-06-29 RX ADMIN — INSULIN GLARGINE 10 UNITS: 100 INJECTION, SOLUTION SUBCUTANEOUS at 20:53

## 2025-06-29 RX ADMIN — METHOCARBAMOL 500 MG: 500 TABLET ORAL at 08:50

## 2025-06-29 RX ADMIN — OXYCODONE 10 MG: 5 TABLET ORAL at 03:47

## 2025-06-29 ASSESSMENT — PAIN DESCRIPTION - LOCATION
LOCATION: CHEST

## 2025-06-29 ASSESSMENT — PAIN DESCRIPTION - ORIENTATION: ORIENTATION: MID;ANTERIOR

## 2025-06-29 ASSESSMENT — PAIN SCALES - GENERAL
PAINLEVEL_OUTOF10: 3
PAINLEVEL_OUTOF10: 9
PAINLEVEL_OUTOF10: 7
PAINLEVEL_OUTOF10: 8

## 2025-06-29 ASSESSMENT — PAIN DESCRIPTION - DESCRIPTORS: DESCRIPTORS: ACHING

## 2025-06-29 NOTE — PROGRESS NOTES
sugar and sliding scale.  4. Stage 4 adenocarcinoma of the lung, taking chemotherapy.  5. Pain - she takes fentanyl patch at home, continue fentanyl patch.  PT/OT for disposition.      NOTE: This report was transcribed using voice recognition software. Every effort was made to ensure accuracy; however, inadvertent computerized transcription errors may be present.  Electronically signed by SIL GUILLEN MD on 6/29/2025 at 2:05 PM

## 2025-06-30 ENCOUNTER — APPOINTMENT (OUTPATIENT)
Dept: ULTRASOUND IMAGING | Age: 65
DRG: 176 | End: 2025-06-30
Payer: MEDICARE

## 2025-06-30 VITALS
SYSTOLIC BLOOD PRESSURE: 120 MMHG | HEART RATE: 73 BPM | WEIGHT: 130 LBS | HEIGHT: 63 IN | DIASTOLIC BLOOD PRESSURE: 84 MMHG | OXYGEN SATURATION: 97 % | TEMPERATURE: 98.2 F | BODY MASS INDEX: 23.04 KG/M2 | RESPIRATION RATE: 17 BRPM

## 2025-06-30 VITALS
WEIGHT: 130 LBS | HEART RATE: 73 BPM | DIASTOLIC BLOOD PRESSURE: 68 MMHG | TEMPERATURE: 99 F | RESPIRATION RATE: 20 BRPM | HEIGHT: 63 IN | SYSTOLIC BLOOD PRESSURE: 105 MMHG | BODY MASS INDEX: 23.04 KG/M2 | OXYGEN SATURATION: 99 %

## 2025-06-30 LAB
GLUCOSE BLD-MCNC: 147 MG/DL (ref 74–99)
GLUCOSE BLD-MCNC: 243 MG/DL (ref 74–99)

## 2025-06-30 PROCEDURE — 97161 PT EVAL LOW COMPLEX 20 MIN: CPT

## 2025-06-30 PROCEDURE — 6370000000 HC RX 637 (ALT 250 FOR IP): Performed by: INTERNAL MEDICINE

## 2025-06-30 PROCEDURE — 6370000000 HC RX 637 (ALT 250 FOR IP)

## 2025-06-30 PROCEDURE — 82962 GLUCOSE BLOOD TEST: CPT

## 2025-06-30 PROCEDURE — 93970 EXTREMITY STUDY: CPT

## 2025-06-30 PROCEDURE — 2500000003 HC RX 250 WO HCPCS: Performed by: NURSE PRACTITIONER

## 2025-06-30 PROCEDURE — 99239 HOSP IP/OBS DSCHRG MGMT >30: CPT | Performed by: INTERNAL MEDICINE

## 2025-06-30 PROCEDURE — 6370000000 HC RX 637 (ALT 250 FOR IP): Performed by: NURSE PRACTITIONER

## 2025-06-30 PROCEDURE — 97530 THERAPEUTIC ACTIVITIES: CPT

## 2025-06-30 PROCEDURE — 97166 OT EVAL MOD COMPLEX 45 MIN: CPT

## 2025-06-30 PROCEDURE — 97535 SELF CARE MNGMENT TRAINING: CPT

## 2025-06-30 PROCEDURE — 99232 SBSQ HOSP IP/OBS MODERATE 35: CPT | Performed by: CLINICAL NURSE SPECIALIST

## 2025-06-30 RX ORDER — FENTANYL 50 UG/1
1 PATCH TRANSDERMAL
Qty: 4 PATCH | Refills: 0 | Status: SHIPPED | OUTPATIENT
Start: 2025-07-02 | End: 2025-07-14

## 2025-06-30 RX ORDER — FENTANYL 50 UG/1
1 PATCH TRANSDERMAL
Qty: 4 PATCH | Refills: 0 | Status: SHIPPED | OUTPATIENT
Start: 2025-07-02 | End: 2025-06-30

## 2025-06-30 RX ADMIN — ATORVASTATIN CALCIUM 80 MG: 40 TABLET, FILM COATED ORAL at 08:59

## 2025-06-30 RX ADMIN — SERTRALINE 100 MG: 100 TABLET, FILM COATED ORAL at 08:59

## 2025-06-30 RX ADMIN — OXYCODONE 10 MG: 5 TABLET ORAL at 13:17

## 2025-06-30 RX ADMIN — SODIUM CHLORIDE, PRESERVATIVE FREE 10 ML: 5 INJECTION INTRAVENOUS at 08:59

## 2025-06-30 RX ADMIN — OXYCODONE 10 MG: 5 TABLET ORAL at 00:04

## 2025-06-30 RX ADMIN — INSULIN LISPRO 1 UNITS: 100 INJECTION, SOLUTION INTRAVENOUS; SUBCUTANEOUS at 06:14

## 2025-06-30 RX ADMIN — METHIMAZOLE 5 MG: 5 TABLET ORAL at 08:59

## 2025-06-30 RX ADMIN — APIXABAN 10 MG: 5 TABLET, FILM COATED ORAL at 08:59

## 2025-06-30 RX ADMIN — OXYCODONE 10 MG: 5 TABLET ORAL at 04:32

## 2025-06-30 RX ADMIN — METHOCARBAMOL 500 MG: 500 TABLET ORAL at 08:59

## 2025-06-30 RX ADMIN — OXYCODONE 10 MG: 5 TABLET ORAL at 09:05

## 2025-06-30 RX ADMIN — METHOCARBAMOL 500 MG: 500 TABLET ORAL at 13:17

## 2025-06-30 RX ADMIN — METOPROLOL SUCCINATE 25 MG: 25 TABLET, EXTENDED RELEASE ORAL at 08:59

## 2025-06-30 ASSESSMENT — PAIN SCALES - GENERAL
PAINLEVEL_OUTOF10: 9
PAINLEVEL_OUTOF10: 8
PAINLEVEL_OUTOF10: 9
PAINLEVEL_OUTOF10: 9

## 2025-06-30 ASSESSMENT — PAIN DESCRIPTION - LOCATION
LOCATION: CHEST;HEAD
LOCATION: CHEST;BACK;NECK
LOCATION: CHEST;BACK
LOCATION: CHEST

## 2025-06-30 ASSESSMENT — PAIN DESCRIPTION - DESCRIPTORS
DESCRIPTORS: ACHING;DISCOMFORT;TENDER
DESCRIPTORS: SHARP;THROBBING
DESCRIPTORS: THROBBING
DESCRIPTORS: ACHING;DISCOMFORT;TENDER;THROBBING

## 2025-06-30 ASSESSMENT — PAIN DESCRIPTION - ORIENTATION
ORIENTATION: POSTERIOR
ORIENTATION: RIGHT;LEFT;MID
ORIENTATION: RIGHT;LEFT;MID

## 2025-06-30 ASSESSMENT — PAIN - FUNCTIONAL ASSESSMENT
PAIN_FUNCTIONAL_ASSESSMENT: ACTIVITIES ARE NOT PREVENTED
PAIN_FUNCTIONAL_ASSESSMENT: ACTIVITIES ARE NOT PREVENTED

## 2025-06-30 ASSESSMENT — ENCOUNTER SYMPTOMS
GASTROINTESTINAL NEGATIVE: 1
SHORTNESS OF BREATH: 1

## 2025-06-30 NOTE — DISCHARGE SUMMARY
June 30, 2025     fentaNYL 50 MCG/HR  Commonly known as: DURAGESIC  Place 1 patch onto the skin every 72 hours for 12 days. Max Daily Amount: 1 patch  Start taking on: July 2, 2025  Replaces: fentaNYL 37.5 MCG/HR Pt72            CONTINUE taking these medications      aspirin 81 MG EC tablet     atorvastatin 80 MG tablet  Commonly known as: LIPITOR     BASAGLAR KWIKPEN SC     clonazePAM 0.5 MG tablet  Commonly known as: KLONOPIN  Take 1 tablet by mouth 3 times daily as needed for Anxiety for up to 14 days. Max Daily Amount: 1.5 mg     clopidogrel 75 MG tablet  Commonly known as: PLAVIX     gabapentin 300 MG capsule  Commonly known as: NEURONTIN     METHIMAZOLE PO     metoprolol succinate 25 MG extended release tablet  Commonly known as: TOPROL XL     NOVOLOG SC     omeprazole 40 MG delayed release capsule  Commonly known as: PRILOSEC     ondansetron 4 MG tablet  Commonly known as: ZOFRAN     oxyCODONE HCl 10 MG immediate release tablet  Commonly known as: OXY-IR     prochlorperazine 10 MG tablet  Commonly known as: COMPAZINE  Take 1 tablet by mouth every 6 hours as needed (nausea)     rOPINIRole 0.25 MG tablet  Commonly known as: REQUIP     sertraline 100 MG tablet  Commonly known as: ZOLOFT     Ventolin  (90 Base) MCG/ACT inhaler  Generic drug: albuterol sulfate HFA            STOP taking these medications      fentaNYL 37.5 MCG/HR Pt72  Replaced by: fentaNYL 50 MCG/HR               Where to Get Your Medications        These medications were sent to [facility] [place] [address] [address] - P [phone] - F [phone]  [address] [place] [address] [place] [address]      Phone: [phone]   apixaban 5 MG Tabs tablet  fentaNYL 50 MCG/HR           Note that more than 30 minutes was spent in preparing discharge papers, discussing discharge with patient, medication review, etc.    Signed:  Electronically signed by SIL GUILLEN MD on 6/30/2025 at 2:15 PM

## 2025-06-30 NOTE — PROGRESS NOTES
CLINICAL PHARMACY NOTE: MEDS TO BEDS    Total # of Prescriptions Filled: 1   The following medications were delivered to the patient:  Eliquis 5 mg    Additional Documentation: patient picked up in pharmacy
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
## 2025-06-30 NOTE — PLAN OF CARE
Problem: Chronic Conditions and Co-morbidities  Goal: Patient's chronic conditions and co-morbidity symptoms are monitored and maintained or improved  6/30/2025 1008 by Justin Rankin RN  Outcome: Progressing  6/29/2025 2359 by Kasia Franklin RN  Outcome: Progressing     Problem: Discharge Planning  Goal: Discharge to home or other facility with appropriate resources  6/30/2025 1008 by Justin Rankin RN  Outcome: Progressing  6/29/2025 2359 by Kasia Franklin RN  Outcome: Progressing     Problem: Pain  Goal: Verbalizes/displays adequate comfort level or baseline comfort level  6/30/2025 1008 by Justin Rankin RN  Outcome: Progressing  6/29/2025 2359 by Kasia Franklin RN  Outcome: Progressing     Problem: Safety - Adult  Goal: Free from fall injury  6/30/2025 1008 by Justin Rankin RN  Outcome: Progressing  6/29/2025 2359 by Kasia Franklin RN  Outcome: Progressing     Problem: Skin/Tissue Integrity  Goal: Skin integrity remains intact  Description: 1.  Monitor for areas of redness and/or skin breakdown  2.  Assess vascular access sites hourly  3.  Every 4-6 hours minimum:  Change oxygen saturation probe site  4.  Every 4-6 hours:  If on nasal continuous positive airway pressure, respiratory therapy assess nares and determine need for appliance change or resting period  6/30/2025 1008 by Justin Rankin RN  Outcome: Progressing  6/29/2025 2359 by Kasia Franklin RN  Outcome: Progressing     Problem: ABCDS Injury Assessment  Goal: Absence of physical injury  6/30/2025 1008 by Justin Rankin RN  Outcome: Progressing  6/29/2025 2359 by Kasia Franklin RN  Outcome: Progressing

## 2025-06-30 NOTE — ACP (ADVANCE CARE PLANNING)
Advance Care Planning   Healthcare Decision Maker:    Primary Decision Maker: [facility] - [phone]    Click here to complete Healthcare Decision Makers including selection of the Healthcare Decision Maker Relationship (ie \"Primary\").
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
## 2025-06-30 NOTE — PROGRESS NOTES
Spiritual Health History and Assessment/Progress Note  [facility]    (P) Initial Encounter, Spiritual/Emotional Needs,  ,  ,      Name: Devi Lema MRN: [id number]    Age: 64 y.o.     Sex: female   Language: English   Christianity: Sabianist   Single subsegmental pulmonary embolism without acute cor pulmonale (HCC)     Date: 6/30/2025                           Spiritual Assessment began in [facility] IMCU/NEURO        Referral/Consult From: (P) Rounding   Encounter Overview/Reason: (P) Initial Encounter, Spiritual/Emotional Needs       Abbey, Belief, Meaning:   Patient identifies as spiritual, is connected with a abbey tradition or spiritual practice, and has beliefs or practices that help with coping during difficult times  Family/Friends No family/friends present      Importance and Influence:  Patient has spiritual/personal beliefs that influence decisions regarding their health  Family/Friends No family/friends present    Community:  Patient feels well-supported. Support system includes: Spouse/Partner and Children  Family/Friends No family/friends present    Assessment and Plan of Care:     Patient Interventions include: Facilitated expression of thoughts and feelings, Explored spiritual coping/struggle/distress, Engaged in theological reflection, Affirmed coping skills/support systems, and Facilitated life review and/ or legacy  Family/Friends Interventions include: No family/friends present    Patient Plan of Care: Spiritual Care available upon further referral  Family/Friends Plan of Care: No family/friends present    Electronically signed by Chaplain Joan on 6/30/2025 at 12:12 PM
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
## 2025-07-12 ASSESSMENT — ENCOUNTER SYMPTOMS: CHEST TIGHTNESS: 0
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
## 2025-07-21 DIAGNOSIS — S22.060D CLOSED WEDGE COMPRESSION FRACTURE OF T8 VERTEBRA WITH ROUTINE HEALING, SUBSEQUENT ENCOUNTER: ICD-10-CM

## 2025-07-21 DIAGNOSIS — T14.90XA TRAUMA: Primary | ICD-10-CM
